# Patient Record
Sex: MALE | Race: WHITE | Employment: UNEMPLOYED | URBAN - METROPOLITAN AREA
[De-identification: names, ages, dates, MRNs, and addresses within clinical notes are randomized per-mention and may not be internally consistent; named-entity substitution may affect disease eponyms.]

---

## 2022-10-21 LAB — PROSTATE SPECIFIC ANTIGEN: 4.55 NG/ML (ref 0–4)

## 2023-02-11 ENCOUNTER — HOSPITAL ENCOUNTER (EMERGENCY)
Age: 53
Discharge: HOME | End: 2023-02-11
Attending: EMERGENCY MEDICINE
Payer: COMMERCIAL

## 2023-02-11 VITALS
SYSTOLIC BLOOD PRESSURE: 155 MMHG | RESPIRATION RATE: 18 BRPM | TEMPERATURE: 98.2 F | HEIGHT: 69 IN | WEIGHT: 184.97 LBS | BODY MASS INDEX: 27.4 KG/M2 | OXYGEN SATURATION: 96 % | HEART RATE: 99 BPM | DIASTOLIC BLOOD PRESSURE: 89 MMHG

## 2023-02-11 DIAGNOSIS — M62.838 SPASM OF MUSCLE: Primary | ICD-10-CM

## 2023-02-11 DIAGNOSIS — S12.9XXS CLOSED FRACTURE OF CERVICAL VERTEBRA, UNSPECIFIED CERVICAL VERTEBRAL LEVEL, SEQUELA: ICD-10-CM

## 2023-02-11 LAB
A/G RATIO: 1.8 (ref 1.1–2.2)
ALBUMIN SERPL-MCNC: 4.2 G/DL (ref 3.4–5)
ALP BLD-CCNC: 70 U/L (ref 40–129)
ALT SERPL-CCNC: 12 U/L (ref 10–40)
AMPHETAMINE SCREEN, URINE: ABNORMAL
ANION GAP SERPL CALCULATED.3IONS-SCNC: 18 MMOL/L (ref 3–16)
AST SERPL-CCNC: 15 U/L (ref 15–37)
BARBITURATE SCREEN URINE: ABNORMAL
BASOPHILS ABSOLUTE: 0.1 K/UL (ref 0–0.2)
BASOPHILS RELATIVE PERCENT: 1.8 %
BENZODIAZEPINE SCREEN, URINE: ABNORMAL
BILIRUB SERPL-MCNC: 0.5 MG/DL (ref 0–1)
BILIRUBIN URINE: NEGATIVE
BLOOD, URINE: ABNORMAL
BUN BLDV-MCNC: 9 MG/DL (ref 7–20)
CALCIUM SERPL-MCNC: 9.4 MG/DL (ref 8.3–10.6)
CANNABINOID SCREEN URINE: POSITIVE
CHLORIDE BLD-SCNC: 100 MMOL/L (ref 99–110)
CLARITY: CLEAR
CO2: 22 MMOL/L (ref 21–32)
COCAINE METABOLITE SCREEN URINE: ABNORMAL
COLOR: YELLOW
COMMENT UA: ABNORMAL
CREAT SERPL-MCNC: 1 MG/DL (ref 0.9–1.3)
CRYSTALS, UA: ABNORMAL /HPF
EOSINOPHILS ABSOLUTE: 0 K/UL (ref 0–0.6)
EOSINOPHILS RELATIVE PERCENT: 0.3 %
EPITHELIAL CELLS, UA: ABNORMAL /HPF (ref 0–5)
FENTANYL SCREEN, URINE: ABNORMAL
GFR SERPL CREATININE-BSD FRML MDRD: >60 ML/MIN/{1.73_M2}
GLUCOSE BLD-MCNC: 178 MG/DL (ref 70–99)
GLUCOSE URINE: NEGATIVE MG/DL
HCT VFR BLD CALC: 39.1 % (ref 40.5–52.5)
HEMOGLOBIN: 13.5 G/DL (ref 13.5–17.5)
HYALINE CASTS: ABNORMAL /LPF (ref 0–2)
KETONES, URINE: NEGATIVE MG/DL
LEUKOCYTE ESTERASE, URINE: NEGATIVE
LYMPHOCYTES ABSOLUTE: 1 K/UL (ref 1–5.1)
LYMPHOCYTES RELATIVE PERCENT: 13.5 %
Lab: ABNORMAL
MCH RBC QN AUTO: 29.9 PG (ref 26–34)
MCHC RBC AUTO-ENTMCNC: 34.5 G/DL (ref 31–36)
MCV RBC AUTO: 86.8 FL (ref 80–100)
METHADONE SCREEN, URINE: ABNORMAL
MICROSCOPIC EXAMINATION: YES
MONOCYTES ABSOLUTE: 0.3 K/UL (ref 0–1.3)
MONOCYTES RELATIVE PERCENT: 4.5 %
MUCUS: ABNORMAL /LPF
NEUTROPHILS ABSOLUTE: 5.7 K/UL (ref 1.7–7.7)
NEUTROPHILS RELATIVE PERCENT: 79.9 %
NITRITE, URINE: NEGATIVE
OPIATE SCREEN URINE: ABNORMAL
OXYCODONE URINE: POSITIVE
PDW BLD-RTO: 14 % (ref 12.4–15.4)
PH UA: 5.5
PH UA: 5.5 (ref 5–8)
PHENCYCLIDINE SCREEN URINE: ABNORMAL
PLATELET # BLD: 263 K/UL (ref 135–450)
PMV BLD AUTO: 8.2 FL (ref 5–10.5)
POTASSIUM REFLEX MAGNESIUM: 4.4 MMOL/L (ref 3.5–5.1)
PROTEIN UA: NEGATIVE MG/DL
RBC # BLD: 4.51 M/UL (ref 4.2–5.9)
RBC UA: ABNORMAL /HPF (ref 0–4)
SODIUM BLD-SCNC: 140 MMOL/L (ref 136–145)
SPECIFIC GRAVITY UA: 1.01 (ref 1–1.03)
TOTAL PROTEIN: 6.6 G/DL (ref 6.4–8.2)
URINE REFLEX TO CULTURE: ABNORMAL
URINE TYPE: ABNORMAL
UROBILINOGEN, URINE: 0.2 E.U./DL
WBC # BLD: 7.2 K/UL (ref 4–11)
WBC UA: ABNORMAL /HPF (ref 0–5)

## 2023-02-11 PROCEDURE — 2580000003 HC RX 258

## 2023-02-11 PROCEDURE — 96372 THER/PROPH/DIAG INJ SC/IM: CPT

## 2023-02-11 PROCEDURE — 80307 DRUG TEST PRSMV CHEM ANLYZR: CPT

## 2023-02-11 PROCEDURE — 96374 THER/PROPH/DIAG INJ IV PUSH: CPT

## 2023-02-11 PROCEDURE — 6360000002 HC RX W HCPCS

## 2023-02-11 PROCEDURE — 99284 EMERGENCY DEPT VISIT MOD MDM: CPT

## 2023-02-11 PROCEDURE — 6370000000 HC RX 637 (ALT 250 FOR IP)

## 2023-02-11 PROCEDURE — 85025 COMPLETE CBC W/AUTO DIFF WBC: CPT

## 2023-02-11 PROCEDURE — 81001 URINALYSIS AUTO W/SCOPE: CPT

## 2023-02-11 PROCEDURE — 80053 COMPREHEN METABOLIC PANEL: CPT

## 2023-02-11 PROCEDURE — 96375 TX/PRO/DX INJ NEW DRUG ADDON: CPT

## 2023-02-11 RX ORDER — MORPHINE SULFATE 2 MG/ML
2 INJECTION, SOLUTION INTRAMUSCULAR; INTRAVENOUS ONCE
Status: COMPLETED | OUTPATIENT
Start: 2023-02-11 | End: 2023-02-11

## 2023-02-11 RX ORDER — ORPHENADRINE CITRATE 30 MG/ML
60 INJECTION INTRAMUSCULAR; INTRAVENOUS ONCE
Status: COMPLETED | OUTPATIENT
Start: 2023-02-11 | End: 2023-02-11

## 2023-02-11 RX ORDER — DIAZEPAM 5 MG/1
5 TABLET ORAL ONCE
Status: COMPLETED | OUTPATIENT
Start: 2023-02-11 | End: 2023-02-11

## 2023-02-11 RX ORDER — SODIUM CHLORIDE, SODIUM LACTATE, POTASSIUM CHLORIDE, AND CALCIUM CHLORIDE .6; .31; .03; .02 G/100ML; G/100ML; G/100ML; G/100ML
1000 INJECTION, SOLUTION INTRAVENOUS ONCE
Status: COMPLETED | OUTPATIENT
Start: 2023-02-11 | End: 2023-02-11

## 2023-02-11 RX ORDER — DIAZEPAM 2 MG/1
2 TABLET ORAL EVERY 8 HOURS PRN
Qty: 10 TABLET | Refills: 0 | Status: SHIPPED | OUTPATIENT
Start: 2023-02-11 | End: 2023-02-21

## 2023-02-11 RX ORDER — ONDANSETRON 2 MG/ML
4 INJECTION INTRAMUSCULAR; INTRAVENOUS ONCE
Status: COMPLETED | OUTPATIENT
Start: 2023-02-11 | End: 2023-02-11

## 2023-02-11 RX ORDER — KETOROLAC TROMETHAMINE 30 MG/ML
30 INJECTION, SOLUTION INTRAMUSCULAR; INTRAVENOUS ONCE
Status: COMPLETED | OUTPATIENT
Start: 2023-02-11 | End: 2023-02-11

## 2023-02-11 RX ADMIN — ORPHENADRINE CITRATE 60 MG: 30 INJECTION INTRAMUSCULAR; INTRAVENOUS at 11:01

## 2023-02-11 RX ADMIN — KETOROLAC TROMETHAMINE 30 MG: 30 INJECTION, SOLUTION INTRAMUSCULAR; INTRAVENOUS at 10:59

## 2023-02-11 RX ADMIN — SODIUM CHLORIDE, POTASSIUM CHLORIDE, SODIUM LACTATE AND CALCIUM CHLORIDE 1000 ML: 600; 310; 30; 20 INJECTION, SOLUTION INTRAVENOUS at 11:04

## 2023-02-11 RX ADMIN — ONDANSETRON 4 MG: 2 INJECTION INTRAMUSCULAR; INTRAVENOUS at 12:24

## 2023-02-11 RX ADMIN — MORPHINE SULFATE 2 MG: 2 INJECTION, SOLUTION INTRAMUSCULAR; INTRAVENOUS at 12:25

## 2023-02-11 RX ADMIN — DIAZEPAM 5 MG: 5 TABLET ORAL at 12:04

## 2023-02-11 ASSESSMENT — ENCOUNTER SYMPTOMS
CONSTIPATION: 0
COUGH: 0
ABDOMINAL PAIN: 0
SHORTNESS OF BREATH: 0
DIARRHEA: 0
VOMITING: 0
EYE PAIN: 0
RHINORRHEA: 0
BACK PAIN: 0
NAUSEA: 0
SORE THROAT: 0

## 2023-02-11 ASSESSMENT — PAIN DESCRIPTION - DESCRIPTORS: DESCRIPTORS: CRAMPING;SPASM

## 2023-02-11 ASSESSMENT — PAIN DESCRIPTION - LOCATION
LOCATION: BACK

## 2023-02-11 ASSESSMENT — PAIN SCALES - GENERAL
PAINLEVEL_OUTOF10: 10
PAINLEVEL_OUTOF10: 10
PAINLEVEL_OUTOF10: 6
PAINLEVEL_OUTOF10: 7

## 2023-02-11 NOTE — DISCHARGE INSTRUCTIONS
Please make sure you follow-up with your family doctor and physical therapy and neurosurgery as we discussed. Return to ED for any worsening symptoms. Please be careful taking Valium. This medication can make you sleepy. You have been prescribed medication, valium, that causes drowsiness. While this is not a problem under normal circumstances, when taken with alcohol or while driving or operating heavy machinery, this medicine could cause you to become too sleepy and/or hurt yourself or others. Therefore, it is very important that you DO NOT DRIVE, DRINK ALCOHOL, OR OPERATE HEAVY MACHINERY WHILE TAKING THE MEDICINE(S) LISTED ABOVE.

## 2023-02-11 NOTE — ED NOTES
Pt able to ambulate in ED without incident. Justine Souza NP notified.      Reynaldo De Souza RN  02/11/23 8788

## 2023-02-11 NOTE — ED PROVIDER NOTES
Attending Supervisory Note/Shared Visit   I have personally performed a face to face diagnostic evaluation on this patient. I have reviewed the mid-levels findings and agree. History and Exam by me shows an alert white male in no acute distress. Patient had a cervical spine injury in October 2021. He has residual hyperesthesias above his waist in his torso and extremities. He has decreased sensation below his waist.  He has had some muscle spasms in his legs in the past.  He is on baclofen. He states they have been worse the last 2 days. General: Alert white male no acute distress. Heart: Regular rate and rhythm. No murmurs or gallops noted. Lungs: Breath sounds equal bilaterally and clear. Abdomen: Soft, nondistended, nontender. Musculoskeletal: No edema upper or lower extremities. Intact distal pulses. Skin: Warm and dry, good turgor. Labs Reviewed   COMPREHENSIVE METABOLIC PANEL W/ REFLEX TO MG FOR LOW K - Abnormal; Notable for the following components:       Result Value    Anion Gap 18 (*)     Glucose 178 (*)     All other components within normal limits   CBC WITH AUTO DIFFERENTIAL - Abnormal; Notable for the following components:    Hematocrit 39.1 (*)     All other components within normal limits   URINALYSIS WITH REFLEX TO CULTURE - Abnormal; Notable for the following components:    Blood, Urine MODERATE (*)     All other components within normal limits   URINE DRUG SCREEN - Abnormal; Notable for the following components:    Cannabinoid Scrn, Ur POSITIVE (*)     Oxycodone Urine POSITIVE (*)     All other components within normal limits   MICROSCOPIC URINALYSIS - Abnormal; Notable for the following components:    Hyaline Casts, UA 3-5 (*)     Mucus, UA 1+ (*)     Crystals, UA Few Ca. Oxalate (*)     All other components within normal limits     This patient's had a previous cervical spine injury as above. He has had problems with spasms in his legs. He takes baclofen.   They have been worse the last 2 days. The baclofen does not seem to be helping. His electrolytes are normal.  He was medicated initially with Toradol and Norflex with minimal improvement. He was subsequently given some Valium and morphine with significant relief. He is already on oxycodone at home. He will be discharged with a prescription for short-term Valium and close outpatient follow-up for management of his spasticity. 1. Spasm of muscle    2.  Closed fracture of cervical vertebra, unspecified cervical vertebral level, sequela      Disposition:  Discharge / Home      (Please note that portions of this note were completed with a voice recognition program.  Efforts were made to edit the dictations but occasionally words are mis-transcribed.)    Estella Hussein MD  Attending Emergency Physician        Beatrice Rubi MD  02/11/23 3143

## 2023-02-11 NOTE — ED PROVIDER NOTES
[address] [facility]        Pt Name: Anne Bailey  MRN: [id number]  [place] 1970  Date of evaluation: 2/11/2023  Provider: PAULINO Isidro - CNP  PCP: Fani Buenrostro MD  Note Started: 10:26 AM EST 2/11/23       I have seen and evaluated this patient with my supervising physician Benedict Saini MD.      [address] [place] [address]       Chief Complaint   Patient presents with    Spasms     Pt arrives via EMS from his PCP for muscle spasms in his legs and \"all the way up. \" Pt states he has had muscle spasms off and on since he broke his neck. Pt states his spasms have been worse the past 2 days       HISTORY OF PRESENT ILLNESS: 1 or more Elements     History From: pt      Anne Bailey is a 46 y.o. male who presents to Ed with c/o spasm from knees up to chest.   States has similar episodes all the time after spinal surgery 1.5 years ago. This episode started 2 days ago and is now worse. Nothing makes symptoms better. Hyperesthesia trunk, old  Home baclofen not helping. Already on oxy @ home. Has sufficient. Nursing Notes were all reviewed and agreed with or any disagreements were addressed in the HPI. REVIEW OF SYSTEMS :      Review of Systems   Constitutional:  Negative for chills, diaphoresis and fever. HENT:  Negative for congestion, rhinorrhea and sore throat. Eyes:  Negative for pain and visual disturbance. Respiratory:  Negative for cough and shortness of breath. Cardiovascular:  Negative for chest pain and leg swelling. Gastrointestinal:  Negative for abdominal pain, constipation, diarrhea, nausea and vomiting. Genitourinary:  Negative for frequency and hematuria. Musculoskeletal:  Positive for arthralgias and myalgias. Negative for back pain and neck pain. Skin:  Negative for rash and wound. Neurological:  Negative for dizziness and light-headedness.      Positives and Pertinent negatives as per HPI.     SURGICAL HISTORY     Past Surgical History:   Procedure Laterality Date    ANTERIOR CRUCIATE LIGAMENT REPAIR  2011    right    CLAVICLE SURGERY  2006    HIP SURGERY  1989    pins       CURRENTMEDICATIONS       Discharge Medication List as of 2023  1:16 PM        CONTINUE these medications which have NOT CHANGED    Details   LANTUS 100 UNIT/ML injection Historical Med      HUMALOG 100 UNIT/ML injection Historical Med      SIMVASTATIN PO Take  by mouth. ALLERGIES     Patient has no known allergies. FAMILYHISTORY       Family History   Problem Relation Age of Onset    Diabetes Paternal Grandmother         SOCIAL HISTORY       Social History     Tobacco Use    Smoking status: Former     Types: Cigarettes     Quit date: 2000     Years since quittin.1    Smokeless tobacco: Never   Substance Use Topics    Alcohol use: Yes    Drug use: No       SCREENINGS        [place] Coma Scale  Eye Opening: Spontaneous  Best Verbal Response: Oriented  Best Motor Response: Obeys commands  [place] Coma Scale Score: 15                CIWA Assessment  BP: (!) 155/89  Heart Rate: 99           PHYSICAL EXAM  1 or more Elements     ED Triage Vitals   BP Temp Temp src Pulse Resp SpO2 Height Weight   -- -- -- -- -- -- -- --       Physical Exam  Vitals and nursing note reviewed. Constitutional:       Appearance: Normal appearance. He is obese. He is not ill-appearing or diaphoretic. HENT:      Head: Normocephalic and atraumatic. Right Ear: External ear normal.      Left Ear: External ear normal.      Nose: Nose normal. No congestion or rhinorrhea. Mouth/Throat:      Mouth: Mucous membranes are moist.      Pharynx: Oropharynx is clear. No oropharyngeal exudate or posterior oropharyngeal erythema. Eyes:      General: No scleral icterus. Right eye: No discharge. Left eye: No discharge. Extraocular Movements: Extraocular movements intact.       Conjunctiva/sclera: Conjunctivae normal.      Pupils: Pupils are equal, round, and reactive to light. Cardiovascular:      Rate and Rhythm: Normal rate and regular rhythm. Pulses: Normal pulses. Heart sounds: Normal heart sounds. No murmur heard. No friction rub. No gallop. Pulmonary:      Effort: Pulmonary effort is normal. No respiratory distress. Breath sounds: Normal breath sounds. No stridor. No wheezing, rhonchi or rales. Abdominal:      General: Abdomen is flat. Bowel sounds are normal. There is no distension. Palpations: Abdomen is soft. Tenderness: There is no abdominal tenderness. There is no right CVA tenderness, left CVA tenderness or guarding. Musculoskeletal:         General: No deformity. Normal range of motion. Cervical back: Normal range of motion and neck supple. No rigidity. Right lower leg: No edema. Left lower leg: No edema. Lymphadenopathy:      Cervical: No cervical adenopathy. Skin:     General: Skin is warm and dry. Capillary Refill: Capillary refill takes less than 2 seconds. Coloration: Skin is not jaundiced or pale. Findings: No bruising or rash. Neurological:      General: No focal deficit present. Mental Status: He is alert and oriented to person, place, and time. Mental status is at baseline.    Psychiatric:         Mood and Affect: Mood normal.         Behavior: Behavior normal.         DIAGNOSTIC RESULTS   LABS:    Labs Reviewed   COMPREHENSIVE METABOLIC PANEL W/ REFLEX TO MG FOR LOW K - Abnormal; Notable for the following components:       Result Value    Anion Gap 18 (*)     Glucose 178 (*)     All other components within normal limits   CBC WITH AUTO DIFFERENTIAL - Abnormal; Notable for the following components:    Hematocrit 39.1 (*)     All other components within normal limits   URINALYSIS WITH REFLEX TO CULTURE - Abnormal; Notable for the following components:    Blood, Urine MODERATE (*)     All other components within normal limits   URINE DRUG SCREEN - Abnormal; Notable for the following components:    Cannabinoid Scrn, Ur POSITIVE (*)     Oxycodone Urine POSITIVE (*)     All other components within normal limits   MICROSCOPIC URINALYSIS - Abnormal; Notable for the following components:    Hyaline Casts, UA 3-5 (*)     Mucus, UA 1+ (*)     Crystals, UA Few Ca. Oxalate (*)     All other components within normal limits       When ordered only abnormal lab results are displayed. All other labs were within normal range or not returned as of this dictation. EKG: When ordered, EKG's are interpreted by the Emergency Department Physician in the absence of a cardiologist.  Please see their note for interpretation of EKG. RADIOLOGY:   Non-plain film images such as CT, Ultrasound and MRI are read by the radiologist. Plain radiographic images are visualized and preliminarily interpreted by the ED Provider with the below findings:        Interpretation per the Radiologist below, if available at the time of this note:    No orders to display     No results found. No results found. PROCEDURES   Unless otherwise noted below, none     Procedures    CRITICAL CARE TIME (.cctime)   I Marion CNP, am the primary clinician of record  I provided 5 minutes of non concurrent Critical Care time, excluding separately reportable procedures. There was a high probability of clinically significant/life threatening deterioration in the patient's condition which required my urgent intervention. This time spent assessing, reassessing patient, chart review and discussing case with other providers      PAST MEDICAL HISTORY      has a past medical history of Diabetes mellitus ([address] and Hyperlipidemia.      EMERGENCY DEPARTMENT COURSE and DIFFERENTIAL DIAGNOSIS/MDM:   Vitals:    Vitals:    02/11/23 1045 02/11/23 1206 02/11/23 1245 02/11/23 1315   BP: (!) 126/58  (!) 142/82 (!) 155/89   Pulse: 92 (!) 101 94 99   Resp:  18 18 18   Temp: TempSrc:       SpO2: 100% 98% 96% 96%   Weight:       Height:           Patient was given the following medications:  Medications   lactated ringers bolus (0 mLs IntraVENous Stopped 2/11/23 1314)   ketorolac (TORADOL) injection 30 mg (30 mg IntraMUSCular Given 2/11/23 1059)   orphenadrine (NORFLEX) injection 60 mg (60 mg IntraMUSCular Given 2/11/23 1101)   diazePAM (VALIUM) tablet 5 mg (5 mg Oral Given 2/11/23 1204)   morphine (PF) injection 2 mg (2 mg IntraVENous Given 2/11/23 1225)   ondansetron (ZOFRAN) injection 4 mg (4 mg IntraVENous Given 2/11/23 1224)             Is this patient to be included in the SEP-1 Core Measure due to severe sepsis or septic shock? No   Exclusion criteria - the patient is NOT to be included for SEP-1 Core Measure due to:  2+ SIRS criteria are not met    Chronic Conditions affecting care:    has a past medical history of Diabetes mellitus ([facility] [address] and Hyperlipidemia. CONSULTS: (Who and What was discussed)  None      Social Determinants Significantly Affecting Health : Patient has / had difficulty affording medications     Records Reviewed (External and Source) MRI 10/21. Visit notes from 2021 when pt had fall with c spine injury    CC/HPI Summary, DDx, ED Course, and Reassessment:     47 yo with muscle spasms as above    Has history of same, worsening last 2 days as above. NO new neurologic deficits. VSS    Started on toradol and muscle relaxer with brief relief. Given dose of PO valium with mild relief. Then escalated to IV morphine with good relief    Able to ambulate around department w/o distress after medication. Labs and UA ordered. No electrolyte derangement. No evidence of infection. Patient will be discharged home. He already has pain medication. We will start him on a brief course of Valium. Referral also provided for physical therapy and neurosurgery for further management. Findings discussed with patient and his wife.   Agreeable    The patient is at low risk for mortality based on demographic, history and clinical factors. Given the best available information and clinical assessment, I estimate the risk of hospitalization to be greater than risk of treatment at home. I have explained to the patient that the risk could rapidly change, given precautions for return and instructions. Explained to patient that the risk for mortality is low based on demographic, history and clinical factors. I discussed with patient the results of evaluation in the ED, diagnosis, care, and prognosis. The plan is to discharge to home. Patient is in agreement with plan and questions have been answered. I also discussed with patient the reasons which may require a return visit and the importance of follow-up care. The patient is well-appearing, nontoxic, and improved at the time of discharge. Patient agrees to call to arrange follow-up care as directed. Patient understands to return immediately for worsening/change in symptoms. Disposition Considerations (tests considered but not done, Admit vs D/C, Shared Decision Making, Pt Expectation of Test or Tx.):     I am the Primary Clinician of Record. FINAL IMPRESSION      1. Spasm of muscle    2. Closed fracture of cervical vertebra, unspecified cervical vertebral level, sequela          DISPOSITION/PLAN     DISPOSITION Decision To Discharge 02/11/2023 01:01:20 PM      PATIENT REFERRED TO:  [facility]  [address] [facility] [address]  [phone]  Call in 2 days  Follow up on your recent ED visit    Ester Bustillos MD  [address]   [address] [facility]  [phone]    Call in 2 days  Follow up on your recent ED visit    Cm Driscoll MD  . [place] [address] [address] [facility]  [phone]    Call in 2 days  Follow up on your recent ED visit    DISCHARGE MEDICATIONS:  Discharge Medication List as of 2/11/2023  1:16 PM        START taking these medications Details   diazePAM (VALIUM) 2 MG tablet Take 1 tablet by mouth every 8 hours as needed for Anxiety (Pain/Spasm) for up to 10 days.  Max Daily Amount: 6 mg, Disp-10 tablet, R-0Print             DISCONTINUED MEDICATIONS:  Discharge Medication List as of 2/11/2023  1:16 PM                 (Please note that portions of this note were completed with a voice recognition program.  Efforts were made to edit the dictations but occasionally words are mis-transcribed.)    PAULINO Joshua CNP (electronically signed)        PAULINO Joshua CNP  02/11/23 0447

## 2023-02-25 ENCOUNTER — APPOINTMENT (OUTPATIENT)
Dept: CT IMAGING | Age: 53
End: 2023-02-25
Payer: COMMERCIAL

## 2023-02-25 ENCOUNTER — HOSPITAL ENCOUNTER (INPATIENT)
Age: 53
LOS: 4 days | Discharge: HOME | End: 2023-03-02
Attending: EMERGENCY MEDICINE | Admitting: STUDENT IN AN ORGANIZED HEALTH CARE EDUCATION/TRAINING PROGRAM
Payer: COMMERCIAL

## 2023-02-25 ENCOUNTER — APPOINTMENT (OUTPATIENT)
Dept: GENERAL RADIOLOGY | Age: 53
End: 2023-02-25
Payer: COMMERCIAL

## 2023-02-25 DIAGNOSIS — M62.838 SPASM OF MUSCLE: ICD-10-CM

## 2023-02-25 DIAGNOSIS — S12.9XXD CLOSED FRACTURE OF CERVICAL VERTEBRA, SUBSEQUENT ENCOUNTER: ICD-10-CM

## 2023-02-25 DIAGNOSIS — M54.9 INTRACTABLE BACK PAIN: Primary | ICD-10-CM

## 2023-02-25 LAB
A/G RATIO: 1.8 (ref 1.1–2.2)
ALBUMIN SERPL-MCNC: 4.5 G/DL (ref 3.4–5)
ALP BLD-CCNC: 69 U/L (ref 40–129)
ALT SERPL-CCNC: 10 U/L (ref 10–40)
ANION GAP SERPL CALCULATED.3IONS-SCNC: 12 MMOL/L (ref 3–16)
AST SERPL-CCNC: 21 U/L (ref 15–37)
BASE EXCESS VENOUS: 1 MMOL/L (ref -3–3)
BASOPHILS ABSOLUTE: 0 K/UL (ref 0–0.2)
BASOPHILS RELATIVE PERCENT: 0.4 %
BILIRUB SERPL-MCNC: 0.7 MG/DL (ref 0–1)
BILIRUBIN URINE: NEGATIVE
BLOOD, URINE: NEGATIVE
BUN BLDV-MCNC: 9 MG/DL (ref 7–20)
CALCIUM SERPL-MCNC: 10 MG/DL (ref 8.3–10.6)
CHLORIDE BLD-SCNC: 106 MMOL/L (ref 99–110)
CLARITY: CLEAR
CO2: 26 MMOL/L (ref 21–32)
COLOR: YELLOW
CREAT SERPL-MCNC: 1 MG/DL (ref 0.9–1.3)
EOSINOPHILS ABSOLUTE: 0 K/UL (ref 0–0.6)
EOSINOPHILS RELATIVE PERCENT: 0.2 %
GFR SERPL CREATININE-BSD FRML MDRD: >60 ML/MIN/{1.73_M2}
GLUCOSE BLD-MCNC: 159 MG/DL (ref 70–99)
GLUCOSE BLD-MCNC: 58 MG/DL (ref 70–99)
GLUCOSE BLD-MCNC: 73 MG/DL (ref 70–99)
GLUCOSE URINE: 250 MG/DL
HCO3 VENOUS: 26.3 MMOL/L (ref 23–29)
HCT VFR BLD CALC: 39.7 % (ref 40.5–52.5)
HEMOGLOBIN: 13.5 G/DL (ref 13.5–17.5)
KETONES, URINE: NEGATIVE MG/DL
LEUKOCYTE ESTERASE, URINE: NEGATIVE
LYMPHOCYTES ABSOLUTE: 1.4 K/UL (ref 1–5.1)
LYMPHOCYTES RELATIVE PERCENT: 14.3 %
MCH RBC QN AUTO: 30.1 PG (ref 26–34)
MCHC RBC AUTO-ENTMCNC: 34 G/DL (ref 31–36)
MCV RBC AUTO: 88.7 FL (ref 80–100)
MICROSCOPIC EXAMINATION: ABNORMAL
MONOCYTES ABSOLUTE: 0.5 K/UL (ref 0–1.3)
MONOCYTES RELATIVE PERCENT: 5 %
NEUTROPHILS ABSOLUTE: 7.8 K/UL (ref 1.7–7.7)
NEUTROPHILS RELATIVE PERCENT: 80.1 %
NITRITE, URINE: NEGATIVE
O2 SAT, VEN: 90 %
O2 THERAPY: ABNORMAL
PCO2, VEN: 45.3 MMHG (ref 40–50)
PDW BLD-RTO: 14.2 % (ref 12.4–15.4)
PERFORMED ON: ABNORMAL
PERFORMED ON: ABNORMAL
PH UA: 6 (ref 5–8)
PH VENOUS: 7.37 (ref 7.35–7.45)
PLATELET # BLD: 346 K/UL (ref 135–450)
PMV BLD AUTO: 8.4 FL (ref 5–10.5)
PO2, VEN: 61 MMHG (ref 25–40)
POTASSIUM REFLEX MAGNESIUM: 4.3 MMOL/L (ref 3.5–5.1)
PROTEIN UA: NEGATIVE MG/DL
RBC # BLD: 4.47 M/UL (ref 4.2–5.9)
SODIUM BLD-SCNC: 144 MMOL/L (ref 136–145)
SPECIFIC GRAVITY UA: 1.02 (ref 1–1.03)
TCO2 CALC VENOUS: 28 MMOL/L
TOTAL PROTEIN: 7 G/DL (ref 6.4–8.2)
URINE REFLEX TO CULTURE: ABNORMAL
URINE TYPE: ABNORMAL
UROBILINOGEN, URINE: 0.2 E.U./DL
WBC # BLD: 9.7 K/UL (ref 4–11)

## 2023-02-25 PROCEDURE — 6360000002 HC RX W HCPCS: Performed by: PHYSICIAN ASSISTANT

## 2023-02-25 PROCEDURE — 36415 COLL VENOUS BLD VENIPUNCTURE: CPT

## 2023-02-25 PROCEDURE — 71045 X-RAY EXAM CHEST 1 VIEW: CPT

## 2023-02-25 PROCEDURE — 2580000003 HC RX 258: Performed by: EMERGENCY MEDICINE

## 2023-02-25 PROCEDURE — 96374 THER/PROPH/DIAG INJ IV PUSH: CPT

## 2023-02-25 PROCEDURE — 6370000000 HC RX 637 (ALT 250 FOR IP): Performed by: PHYSICIAN ASSISTANT

## 2023-02-25 PROCEDURE — 99285 EMERGENCY DEPT VISIT HI MDM: CPT

## 2023-02-25 PROCEDURE — 82803 BLOOD GASES ANY COMBINATION: CPT

## 2023-02-25 PROCEDURE — 72131 CT LUMBAR SPINE W/O DYE: CPT

## 2023-02-25 PROCEDURE — 6360000002 HC RX W HCPCS: Performed by: EMERGENCY MEDICINE

## 2023-02-25 PROCEDURE — 2500000003 HC RX 250 WO HCPCS: Performed by: EMERGENCY MEDICINE

## 2023-02-25 PROCEDURE — 96375 TX/PRO/DX INJ NEW DRUG ADDON: CPT

## 2023-02-25 PROCEDURE — 81003 URINALYSIS AUTO W/O SCOPE: CPT

## 2023-02-25 PROCEDURE — 96372 THER/PROPH/DIAG INJ SC/IM: CPT

## 2023-02-25 PROCEDURE — 80053 COMPREHEN METABOLIC PANEL: CPT

## 2023-02-25 PROCEDURE — 85025 COMPLETE CBC W/AUTO DIFF WBC: CPT

## 2023-02-25 RX ORDER — MORPHINE SULFATE 2 MG/ML
2 INJECTION, SOLUTION INTRAMUSCULAR; INTRAVENOUS ONCE
Status: COMPLETED | OUTPATIENT
Start: 2023-02-25 | End: 2023-02-25

## 2023-02-25 RX ORDER — OXYCODONE HYDROCHLORIDE AND ACETAMINOPHEN 5; 325 MG/1; MG/1
1 TABLET ORAL ONCE
Status: COMPLETED | OUTPATIENT
Start: 2023-02-25 | End: 2023-02-25

## 2023-02-25 RX ORDER — BACLOFEN 10 MG/1
10 TABLET ORAL 4 TIMES DAILY PRN
Status: ON HOLD | COMMUNITY
Start: 2022-12-13 | End: 2023-03-02 | Stop reason: HOSPADM

## 2023-02-25 RX ORDER — MORPHINE SULFATE 2 MG/ML
2 INJECTION, SOLUTION INTRAMUSCULAR; INTRAVENOUS
Status: COMPLETED | OUTPATIENT
Start: 2023-02-25 | End: 2023-02-25

## 2023-02-25 RX ORDER — FLUMAZENIL 0.1 MG/ML
0.2 INJECTION INTRAVENOUS ONCE
Status: COMPLETED | OUTPATIENT
Start: 2023-02-25 | End: 2023-02-25

## 2023-02-25 RX ORDER — TAMSULOSIN HYDROCHLORIDE 0.4 MG/1
0.4 CAPSULE ORAL DAILY
COMMUNITY
Start: 2023-01-10

## 2023-02-25 RX ORDER — KETOROLAC TROMETHAMINE 30 MG/ML
30 INJECTION, SOLUTION INTRAMUSCULAR; INTRAVENOUS ONCE
Status: COMPLETED | OUTPATIENT
Start: 2023-02-25 | End: 2023-02-25

## 2023-02-25 RX ORDER — OXYCODONE AND ACETAMINOPHEN 10; 325 MG/1; MG/1
1 TABLET ORAL ONCE
Status: DISCONTINUED | OUTPATIENT
Start: 2023-02-25 | End: 2023-02-25

## 2023-02-25 RX ORDER — INSULIN DEGLUDEC INJECTION 100 U/ML
INJECTION, SOLUTION SUBCUTANEOUS
COMMUNITY
Start: 2022-11-28 | End: 2023-02-25

## 2023-02-25 RX ORDER — OXYCODONE HYDROCHLORIDE 5 MG/1
15 TABLET ORAL NIGHTLY
Status: ON HOLD | COMMUNITY
Start: 2023-02-24 | End: 2023-03-02 | Stop reason: HOSPADM

## 2023-02-25 RX ORDER — BLOOD-GLUCOSE SENSOR
EACH MISCELLANEOUS
COMMUNITY
Start: 2023-02-06

## 2023-02-25 RX ORDER — BLOOD-GLUCOSE TRANSMITTER
EACH MISCELLANEOUS
COMMUNITY
Start: 2023-02-04

## 2023-02-25 RX ORDER — DEXTROSE MONOHYDRATE 25 G/50ML
25 INJECTION, SOLUTION INTRAVENOUS ONCE
Status: COMPLETED | OUTPATIENT
Start: 2023-02-25 | End: 2023-02-25

## 2023-02-25 RX ORDER — NALOXONE HYDROCHLORIDE 0.4 MG/ML
INJECTION, SOLUTION INTRAMUSCULAR; INTRAVENOUS; SUBCUTANEOUS
Status: COMPLETED
Start: 2023-02-25 | End: 2023-02-26

## 2023-02-25 RX ORDER — NALOXONE HYDROCHLORIDE 1 MG/ML
INJECTION INTRAMUSCULAR; INTRAVENOUS; SUBCUTANEOUS
Status: COMPLETED
Start: 2023-02-25 | End: 2023-02-26

## 2023-02-25 RX ORDER — NALOXONE HYDROCHLORIDE 0.4 MG/ML
0.4 INJECTION, SOLUTION INTRAMUSCULAR; INTRAVENOUS; SUBCUTANEOUS ONCE
Status: COMPLETED | OUTPATIENT
Start: 2023-02-25 | End: 2023-02-25

## 2023-02-25 RX ORDER — LORAZEPAM 2 MG/ML
1 INJECTION INTRAMUSCULAR ONCE
Status: COMPLETED | OUTPATIENT
Start: 2023-02-25 | End: 2023-02-25

## 2023-02-25 RX ORDER — OXYCODONE HYDROCHLORIDE 5 MG/1
5 TABLET ORAL ONCE
Status: COMPLETED | OUTPATIENT
Start: 2023-02-25 | End: 2023-02-25

## 2023-02-25 RX ORDER — SODIUM CHLORIDE 9 MG/ML
INJECTION, SOLUTION INTRAVENOUS ONCE
Status: COMPLETED | OUTPATIENT
Start: 2023-02-25 | End: 2023-02-25

## 2023-02-25 RX ORDER — DIAZEPAM 5 MG/1
5 TABLET ORAL ONCE
Status: COMPLETED | OUTPATIENT
Start: 2023-02-25 | End: 2023-02-25

## 2023-02-25 RX ORDER — ATORVASTATIN CALCIUM 20 MG/1
TABLET, FILM COATED ORAL
COMMUNITY
Start: 2021-05-24 | End: 2023-02-25

## 2023-02-25 RX ADMIN — MORPHINE SULFATE 2 MG: 2 INJECTION, SOLUTION INTRAMUSCULAR; INTRAVENOUS at 19:10

## 2023-02-25 RX ADMIN — NALOXONE HYDROCHLORIDE 0.4 MG: 0.4 INJECTION, SOLUTION INTRAMUSCULAR; INTRAVENOUS; SUBCUTANEOUS at 23:11

## 2023-02-25 RX ADMIN — MORPHINE SULFATE 2 MG: 2 INJECTION, SOLUTION INTRAMUSCULAR; INTRAVENOUS at 18:42

## 2023-02-25 RX ADMIN — HYDROMORPHONE HYDROCHLORIDE 2 MG: 1 INJECTION, SOLUTION INTRAMUSCULAR; INTRAVENOUS; SUBCUTANEOUS at 21:21

## 2023-02-25 RX ADMIN — LORAZEPAM 1 MG: 2 INJECTION INTRAMUSCULAR; INTRAVENOUS at 21:23

## 2023-02-25 RX ADMIN — SODIUM CHLORIDE: 9 INJECTION, SOLUTION INTRAVENOUS at 23:11

## 2023-02-25 RX ADMIN — OXYCODONE 5 MG: 5 TABLET ORAL at 19:54

## 2023-02-25 RX ADMIN — OXYCODONE AND ACETAMINOPHEN 1 TABLET: 5; 325 TABLET ORAL at 19:54

## 2023-02-25 RX ADMIN — KETOROLAC TROMETHAMINE 30 MG: 30 INJECTION, SOLUTION INTRAMUSCULAR; INTRAVENOUS at 18:45

## 2023-02-25 RX ADMIN — DIAZEPAM 5 MG: 5 TABLET ORAL at 18:42

## 2023-02-25 RX ADMIN — FLUMAZENIL 0.2 MG: 0.1 INJECTION, SOLUTION INTRAVENOUS at 23:52

## 2023-02-25 RX ADMIN — DEXTROSE MONOHYDRATE 25 G: 25 INJECTION, SOLUTION INTRAVENOUS at 23:11

## 2023-02-25 ASSESSMENT — ENCOUNTER SYMPTOMS
COLOR CHANGE: 0
BACK PAIN: 1
NAUSEA: 0
VOMITING: 0
SHORTNESS OF BREATH: 0
COUGH: 0

## 2023-02-25 ASSESSMENT — PAIN SCALES - GENERAL
PAINLEVEL_OUTOF10: 10
PAINLEVEL_OUTOF10: 10
PAINLEVEL_OUTOF10: 0
PAINLEVEL_OUTOF10: 10

## 2023-02-25 NOTE — ED NOTES
Pt states had similar spasm about 2 weeks ago  Hx of cervical injury in past     Norm Lakshmi, [facility]  02/25/23 0608

## 2023-02-25 NOTE — ED PROVIDER NOTES
[address] ENCOUNTER        Pt Name: Ines Castellanos  MRN: [id number]  [place] 1970  Date of evaluation: 2/25/2023  Provider: REHANA Kinney  PCP: Bhavesh Pereira  Note Started: 6:42 PM EST 2/25/23       I have seen and evaluated this patient with my supervising physician Hunter Rivera, [address]       Chief Complaint   Patient presents with    Other     Muscle spasm starting at 1pm today all over       HISTORY OF PRESENT ILLNESS: 1 or more Elements     History From: patient  Limitations to history : None    Ines Castellanos is a 46 y.o. male who presents to the emergency department today with his wife with complaints of a muscle spasm. Patient reports that his symptoms started today around 1 PM.  He states muscle spasms are all over, and debilitating. He states that he does have a history of a cervical spine injury and historically has been treating his muscle spasms with baclofen, oxycodone. He has been out of his oxycodone for the last 3 to 4 days. His baclofen is not helping to improve his symptoms. He denies any recent reinjury or fall. He does follow regularly with his pain management providers, and is working to get a order for another imaging study to further evaluate his spine on an outpatient basis. He presents today because of the severity of pain, and a few weeks ago this happened and he was treated with morphine and Valium which did help to significantly improve his symptoms. He has no further complaints    Nursing Notes were all reviewed and agreed with or any disagreements were addressed in the HPI. REVIEW OF SYSTEMS :      Review of Systems   Constitutional:  Negative for chills and fever. Respiratory:  Negative for cough and shortness of breath. Cardiovascular:  Negative for chest pain and palpitations. Gastrointestinal:  Negative for nausea and vomiting.    Musculoskeletal:  Positive for back pain and myalgias. Negative for arthralgias. Spasms, upper back   Skin:  Negative for color change and rash. Neurological:  Negative for dizziness and headaches. Psychiatric/Behavioral:  Negative for agitation and confusion. Positives and Pertinent negatives as per HPI. SURGICAL HISTORY     Past Surgical History:   Procedure Laterality Date    ANTERIOR CRUCIATE LIGAMENT REPAIR  2011    right    CLAVICLE SURGERY  2006    HIP SURGERY  1989    pins       CURRENTMEDICATIONS       Previous Medications    BACLOFEN (LIORESAL) 10 MG TABLET    Take 10 mg by mouth 4 times daily as needed    CONTINUOUS BLOOD GLUC SENSOR (DEXCOM G6 SENSOR) MISC    USE 1 EVERY 10 DAYS    CONTINUOUS BLOOD GLUC TRANSMIT (DEXCOM G6 TRANSMITTER) MISC    USE 1 EVERY 90 DAYS    HUMALOG 100 UNIT/ML INJECTION        INSULIN ASPART (NOVOLOG) 100 UNIT/ML INJECTION PEN    Inject 5-10 Units into the skin 4 times daily (after meals and at bedtime)    LANTUS 100 UNIT/ML INJECTION        OXYCODONE (ROXICODONE) 5 MG IMMEDIATE RELEASE TABLET    Take 5 mg by mouth 3 times daily as needed. SIMVASTATIN PO    Take  by mouth. TAMSULOSIN (FLOMAX) 0.4 MG CAPSULE    Take 0.4 mg by mouth daily       ALLERGIES     Patient has no known allergies. FAMILYHISTORY       Family History   Problem Relation Age of Onset    Diabetes Paternal Grandmother         SOCIAL HISTORY       Social History     Tobacco Use    Smoking status: Former     Types: Cigarettes     Quit date: 2000     Years since quittin.1    Smokeless tobacco: Never   Substance Use Topics    Alcohol use: Yes    Drug use: No       SCREENINGS                         CIWA Assessment  BP: (!) 175/90 (Pt wouldn't stay still.  RN Manual Zearing notified at [facility])  Heart Rate: (!) 102           PHYSICAL EXAM  1 or more Elements     ED Triage Vitals [23 1818]   BP Temp Temp Source Heart Rate Resp SpO2 Height Weight   (!) 175/90 97.8 °F (36.6 °C) Oral (!) 102 27 97 % 6' (1.829 m) 140 lb (63.5 kg)       Physical Exam  Vitals and nursing note reviewed.   Constitutional:       General: He is not in acute distress.     Appearance: He is well-developed. He is diaphoretic. He is not ill-appearing or toxic-appearing.   HENT:      Head: Normocephalic and atraumatic.   Eyes:      Conjunctiva/sclera: Conjunctivae normal.      Pupils: Pupils are equal, round, and reactive to light.   Neck:      Comments: Posterior surgical scar noted  Cardiovascular:      Rate and Rhythm: Normal rate and regular rhythm.   Pulmonary:      Effort: Pulmonary effort is normal. No respiratory distress.   Abdominal:      General: Bowel sounds are normal.      Palpations: Abdomen is soft.   Skin:     General: Skin is warm.   Neurological:      General: No focal deficit present.      Mental Status: He is alert and oriented to person, place, and time.      Comments: Pacing room, sweating, but ambulatory and moving extremities   Psychiatric:         Mood and Affect: Mood normal.         Behavior: Behavior normal. Behavior is cooperative.           DIAGNOSTIC RESULTS   LABS:    Labs Reviewed - No data to display    When ordered only abnormal lab results are displayed. All other labs were within normal range or not returned as of this dictation.    EKG: When ordered, EKG's are interpreted by the Emergency Department Physician in the absence of a cardiologist.  Please see their note for interpretation of EKG.    RADIOLOGY:   Non-plain film images such as CT, Ultrasound and MRI are read by the radiologist. Plain radiographic images are visualized and preliminarily interpreted by the ED Provider with the below findings:    Interpretation per the Radiologist below, if available at the time of this note:    No orders to display     No results found.    No results found.    PROCEDURES   Unless otherwise noted below, none     Procedures    PAST MEDICAL HISTORY      has a past medical history of Diabetes mellitus (HCC) and Hyperlipidemia.  EMERGENCY DEPARTMENT COURSE and DIFFERENTIAL DIAGNOSIS/MDM:   Vitals:    Vitals:    02/25/23 1818   BP: (!) 175/90   Pulse: (!) 102   Resp: 27   Temp: 97.8 °F (36.6 °C)   TempSrc: Oral   SpO2: 97%   Weight: 140 lb (63.5 kg)   Height: 6' (1.829 m)       Patient was given the following medications:  Medications   morphine injection 2 mg (2 mg IntraMUSCular Given 2/25/23 1842)   diazePAM (VALIUM) tablet 5 mg (5 mg Oral Given 2/25/23 1842)   ketorolac (TORADOL) injection 30 mg (30 mg IntraMUSCular Given 2/25/23 1845)   morphine injection 2 mg (2 mg IntraMUSCular Given 2/25/23 1910)   oxyCODONE-acetaminophen (PERCOCET) 5-325 MG per tablet 1 tablet (1 tablet Oral Given 2/25/23 1954)     And   oxyCODONE (ROXICODONE) immediate release tablet 5 mg (5 mg Oral Given 2/25/23 1954)             Is this patient to be included in the SEP-1 Core Measure due to severe sepsis or septic shock? No   Exclusion criteria - the patient is NOT to be included for SEP-1 Core Measure due to: Infection is not suspected    Chronic Conditions affecting care:    has a past medical history of Diabetes mellitus ([facility] [address] and Hyperlipidemia. CONSULTS: (Who and What was discussed)  None      Social Determinants Significantly Affecting Health : None    Records Reviewed (External and Source) EMR, outpatient visits with pain management. PDMP reviewed, patient most recently had an oxycodone 5 mg tablet for quantity of 90, 30-day supply prescribed to him on 1/23/2023. CC/HPI Summary, DDx, ED Course, and Reassessment: This is a 49-year-old male who presents today with his wife with complaints of a muscle spasm. Patient reports that his symptoms started today around 1 PM.  He states muscle spasms are all over, and debilitating. He states that he does have a history of a cervical spine injury and historically has been treating his muscle spasms with baclofen, oxycodone. He has been out of his oxycodone for the last 3 to 4 days.   His baclofen is not helping to improve his symptoms. He denies any recent reinjury or fall. He does follow regularly with his pain management providers, and is working to get a order for another imaging study to further evaluate his spine on an outpatient basis. He presents today because of the severity of pain, and a few weeks ago this happened and he was treated with morphine and Valium which did help to significantly improve his symptoms. - Vital signs were reviewed. Exam as above. - Pertinent Labs & Imaging studies reviewed. (See chart for details)   -  Patient seen and evaluated in the emergency department. -  Triage and nursing notes reviewed and incorporated. -  Old chart records reviewed and incorporated. -   I have seen and evaluated this patient with my supervising physician Dimple Lennon DO.  -  Differential diagnosis includes abrasion/laceration, contusion, fracture, sprain/strain, dislocation, withdrawal, spasm, other  -  Work-up included:  See above  -  ED treatment included: Valium 5 mg p.o., morphine 2 mg IM, Toradol 15 mg IM. After being evaluated by Dr Cedric Barker, he advised another dose of morphine 2mg IM and percocet 10/325 mg PO as well in addition to what was given previously. - At 1952 the nursing staff notified myself and Dr. Cedric Barker that he was pacing the room, when he went to sit in a chair he missed the edge of it and fell landing on his bottom. He did not hit his head or lose consciousness. He was able to get himself up off of the ground. He states he does not have any increased pain in his back, buttocks, or hips. I did reevaluate him, he has no tenderness along his lower thoracic, lumbar spine. His pelvis is stable and he is able to lift both legs up and off the bed without difficulty. He has no neurological deficits on exam in his lower extremities after the fall.   At this time we do not think that imaging is indicated given the very short distance from the seat of the chair to the ground, and the normal exam findings. He was advised that he does need to stay in bed as we work to get his pain under control. Shortly after that, the nursing staff did provide to him his dose of oxycodone 5/325 and oxycodone 5 mg tablets, because we do not have the Percocet 10 mg/325 at this freestanding emergency department. - Critical Care:  0 minutes    *At the time of the shift change, re-evaluation was pending. Care was turned over to Dr Kaylene Napier. Please see his note for final diagnosis and disposition for this patient. FINAL IMPRESSION      1. Spasm of muscle    2. Closed fracture of cervical vertebra, subsequent encounter          DISPOSITION/PLAN     DISPOSITION        PATIENT REFERRED TO:  [address]  [address] [facility] [address]  [phone]    Schedule an appointment as soon as possible for a visit       [place] [id number] [phone]    If symptoms worsen    DISCHARGE MEDICATIONS:  New Prescriptions    No medications on file       DISCONTINUED MEDICATIONS:  Discontinued Medications    ATORVASTATIN (LIPITOR) 20 MG TABLET    TAKE 1 TABLET BY MOUTH EVERY DAY AT NIGHT    INSULIN DEGLUDEC (TRESIBA FLEXTOUCH) 100 UNIT/ML SOPN    INJECT 20-25 UNITS BY SUBCUTANEOUS ROUTE DAILY.               (Please note that portions of this note were completed with a voice recognition program.  Efforts were made to edit the dictations but occasionally words are mis-transcribed.)    REHANA Minaya (electronically signed)       Sheryl López [place]  02/25/23 [address] [place]  02/25/23 1956

## 2023-02-26 ENCOUNTER — APPOINTMENT (OUTPATIENT)
Dept: MRI IMAGING | Age: 53
End: 2023-02-26
Payer: COMMERCIAL

## 2023-02-26 ENCOUNTER — APPOINTMENT (OUTPATIENT)
Dept: CT IMAGING | Age: 53
End: 2023-02-26
Payer: COMMERCIAL

## 2023-02-26 PROBLEM — M54.9 INTRACTABLE BACK PAIN: Status: ACTIVE | Noted: 2023-02-26

## 2023-02-26 PROBLEM — E11.9 DM2 (DIABETES MELLITUS, TYPE 2) (HCC): Status: ACTIVE | Noted: 2023-02-26

## 2023-02-26 PROBLEM — E78.5 HLD (HYPERLIPIDEMIA): Status: ACTIVE | Noted: 2023-02-26

## 2023-02-26 LAB
GLUCOSE BLD-MCNC: 104 MG/DL (ref 70–99)
GLUCOSE BLD-MCNC: 107 MG/DL (ref 70–99)
GLUCOSE BLD-MCNC: 127 MG/DL (ref 70–99)
GLUCOSE BLD-MCNC: 127 MG/DL (ref 70–99)
GLUCOSE BLD-MCNC: 134 MG/DL (ref 70–99)
GLUCOSE BLD-MCNC: 50 MG/DL (ref 70–99)
GLUCOSE BLD-MCNC: 54 MG/DL (ref 70–99)
GLUCOSE BLD-MCNC: 57 MG/DL (ref 70–99)
GLUCOSE BLD-MCNC: 66 MG/DL (ref 70–99)
GLUCOSE BLD-MCNC: 73 MG/DL (ref 70–99)
GLUCOSE BLD-MCNC: 87 MG/DL (ref 70–99)
PERFORMED ON: ABNORMAL
PERFORMED ON: NORMAL
PERFORMED ON: NORMAL
TOTAL CK: 1221 U/L (ref 39–308)

## 2023-02-26 PROCEDURE — 2580000003 HC RX 258: Performed by: INTERNAL MEDICINE

## 2023-02-26 PROCEDURE — 6370000000 HC RX 637 (ALT 250 FOR IP): Performed by: INTERNAL MEDICINE

## 2023-02-26 PROCEDURE — 72125 CT NECK SPINE W/O DYE: CPT

## 2023-02-26 PROCEDURE — 6360000002 HC RX W HCPCS: Performed by: INTERNAL MEDICINE

## 2023-02-26 PROCEDURE — 2060000000 HC ICU INTERMEDIATE R&B

## 2023-02-26 PROCEDURE — 72146 MRI CHEST SPINE W/O DYE: CPT

## 2023-02-26 PROCEDURE — 72148 MRI LUMBAR SPINE W/O DYE: CPT

## 2023-02-26 PROCEDURE — 96375 TX/PRO/DX INJ NEW DRUG ADDON: CPT

## 2023-02-26 PROCEDURE — 36415 COLL VENOUS BLD VENIPUNCTURE: CPT

## 2023-02-26 PROCEDURE — 6370000000 HC RX 637 (ALT 250 FOR IP): Performed by: STUDENT IN AN ORGANIZED HEALTH CARE EDUCATION/TRAINING PROGRAM

## 2023-02-26 PROCEDURE — 82550 ASSAY OF CK (CPK): CPT

## 2023-02-26 PROCEDURE — 94760 N-INVAS EAR/PLS OXIMETRY 1: CPT

## 2023-02-26 PROCEDURE — 72141 MRI NECK SPINE W/O DYE: CPT

## 2023-02-26 PROCEDURE — 6360000002 HC RX W HCPCS: Performed by: STUDENT IN AN ORGANIZED HEALTH CARE EDUCATION/TRAINING PROGRAM

## 2023-02-26 PROCEDURE — 2580000003 HC RX 258: Performed by: STUDENT IN AN ORGANIZED HEALTH CARE EDUCATION/TRAINING PROGRAM

## 2023-02-26 PROCEDURE — 6360000002 HC RX W HCPCS: Performed by: EMERGENCY MEDICINE

## 2023-02-26 PROCEDURE — 96376 TX/PRO/DX INJ SAME DRUG ADON: CPT

## 2023-02-26 PROCEDURE — 6360000002 HC RX W HCPCS

## 2023-02-26 RX ORDER — DIAZEPAM 5 MG/1
10 TABLET ORAL EVERY 6 HOURS PRN
Status: DISCONTINUED | OUTPATIENT
Start: 2023-02-26 | End: 2023-03-02 | Stop reason: HOSPADM

## 2023-02-26 RX ORDER — ACETAMINOPHEN 500 MG
1000 TABLET ORAL EVERY 8 HOURS SCHEDULED
Status: DISCONTINUED | OUTPATIENT
Start: 2023-02-26 | End: 2023-03-02 | Stop reason: HOSPADM

## 2023-02-26 RX ORDER — NALOXONE HYDROCHLORIDE 0.4 MG/ML
0.4 INJECTION, SOLUTION INTRAMUSCULAR; INTRAVENOUS; SUBCUTANEOUS PRN
Status: DISCONTINUED | OUTPATIENT
Start: 2023-02-26 | End: 2023-02-26

## 2023-02-26 RX ORDER — NALOXONE HYDROCHLORIDE 0.4 MG/ML
0.4 INJECTION, SOLUTION INTRAMUSCULAR; INTRAVENOUS; SUBCUTANEOUS ONCE
Status: COMPLETED | OUTPATIENT
Start: 2023-02-26 | End: 2023-02-26

## 2023-02-26 RX ORDER — INSULIN GLARGINE 100 [IU]/ML
7 INJECTION, SOLUTION SUBCUTANEOUS DAILY
Status: DISCONTINUED | OUTPATIENT
Start: 2023-02-27 | End: 2023-03-01

## 2023-02-26 RX ORDER — ONDANSETRON 2 MG/ML
4 INJECTION INTRAMUSCULAR; INTRAVENOUS EVERY 6 HOURS PRN
Status: DISCONTINUED | OUTPATIENT
Start: 2023-02-26 | End: 2023-03-01

## 2023-02-26 RX ORDER — TAMSULOSIN HYDROCHLORIDE 0.4 MG/1
0.4 CAPSULE ORAL DAILY
Status: DISCONTINUED | OUTPATIENT
Start: 2023-02-26 | End: 2023-03-02 | Stop reason: HOSPADM

## 2023-02-26 RX ORDER — METHOCARBAMOL 500 MG/1
500 TABLET, FILM COATED ORAL 4 TIMES DAILY
Status: DISCONTINUED | OUTPATIENT
Start: 2023-02-26 | End: 2023-02-26

## 2023-02-26 RX ORDER — LIDOCAINE 4 G/G
1 PATCH TOPICAL DAILY
Status: DISCONTINUED | OUTPATIENT
Start: 2023-02-26 | End: 2023-03-02 | Stop reason: HOSPADM

## 2023-02-26 RX ORDER — INSULIN GLARGINE 100 [IU]/ML
13 INJECTION, SOLUTION SUBCUTANEOUS DAILY
Status: DISCONTINUED | OUTPATIENT
Start: 2023-02-26 | End: 2023-02-26

## 2023-02-26 RX ORDER — ACETAMINOPHEN 325 MG/1
650 TABLET ORAL EVERY 6 HOURS PRN
Status: DISCONTINUED | OUTPATIENT
Start: 2023-02-26 | End: 2023-02-26

## 2023-02-26 RX ORDER — ATORVASTATIN CALCIUM 20 MG/1
20 TABLET, FILM COATED ORAL DAILY
COMMUNITY

## 2023-02-26 RX ORDER — PANTOPRAZOLE SODIUM 40 MG/1
40 TABLET, DELAYED RELEASE ORAL
Status: DISCONTINUED | OUTPATIENT
Start: 2023-02-26 | End: 2023-03-02 | Stop reason: HOSPADM

## 2023-02-26 RX ORDER — NALOXONE HYDROCHLORIDE 0.4 MG/ML
0.4 INJECTION, SOLUTION INTRAMUSCULAR; INTRAVENOUS; SUBCUTANEOUS PRN
Status: DISCONTINUED | OUTPATIENT
Start: 2023-02-26 | End: 2023-03-02 | Stop reason: HOSPADM

## 2023-02-26 RX ORDER — ENOXAPARIN SODIUM 100 MG/ML
40 INJECTION SUBCUTANEOUS DAILY
Status: DISCONTINUED | OUTPATIENT
Start: 2023-02-26 | End: 2023-03-02 | Stop reason: HOSPADM

## 2023-02-26 RX ORDER — ORPHENADRINE CITRATE 30 MG/ML
60 INJECTION INTRAMUSCULAR; INTRAVENOUS ONCE
Status: COMPLETED | OUTPATIENT
Start: 2023-02-26 | End: 2023-02-26

## 2023-02-26 RX ORDER — OXYCODONE HYDROCHLORIDE 5 MG/1
5 TABLET ORAL EVERY 6 HOURS PRN
Status: DISCONTINUED | OUTPATIENT
Start: 2023-02-26 | End: 2023-02-26 | Stop reason: SDUPTHER

## 2023-02-26 RX ORDER — KETOROLAC TROMETHAMINE 30 MG/ML
30 INJECTION, SOLUTION INTRAMUSCULAR; INTRAVENOUS EVERY 6 HOURS
Status: COMPLETED | OUTPATIENT
Start: 2023-02-26 | End: 2023-03-01

## 2023-02-26 RX ORDER — METHOCARBAMOL 100 MG/ML
750 INJECTION, SOLUTION INTRAMUSCULAR; INTRAVENOUS ONCE
Status: DISCONTINUED | OUTPATIENT
Start: 2023-02-26 | End: 2023-02-26

## 2023-02-26 RX ORDER — INSULIN GLARGINE 100 [IU]/ML
13 INJECTION, SOLUTION SUBCUTANEOUS DAILY
Status: ON HOLD | COMMUNITY
End: 2023-02-26

## 2023-02-26 RX ORDER — OXYCODONE HYDROCHLORIDE 10 MG/1
10 TABLET ORAL EVERY 6 HOURS PRN
Status: DISCONTINUED | OUTPATIENT
Start: 2023-02-26 | End: 2023-02-26

## 2023-02-26 RX ORDER — ACETAMINOPHEN 650 MG/1
650 SUPPOSITORY RECTAL EVERY 6 HOURS PRN
Status: DISCONTINUED | OUTPATIENT
Start: 2023-02-26 | End: 2023-02-26

## 2023-02-26 RX ORDER — SODIUM CHLORIDE 0.9 % (FLUSH) 0.9 %
5-40 SYRINGE (ML) INJECTION EVERY 12 HOURS SCHEDULED
Status: DISCONTINUED | OUTPATIENT
Start: 2023-02-26 | End: 2023-03-02 | Stop reason: HOSPADM

## 2023-02-26 RX ORDER — SODIUM CHLORIDE 9 MG/ML
INJECTION, SOLUTION INTRAVENOUS CONTINUOUS
Status: DISCONTINUED | OUTPATIENT
Start: 2023-02-26 | End: 2023-02-27

## 2023-02-26 RX ORDER — DIAZEPAM 5 MG/1
5 TABLET ORAL EVERY 6 HOURS PRN
Status: DISCONTINUED | OUTPATIENT
Start: 2023-02-26 | End: 2023-02-26

## 2023-02-26 RX ORDER — SODIUM CHLORIDE 9 MG/ML
INJECTION, SOLUTION INTRAVENOUS PRN
Status: DISCONTINUED | OUTPATIENT
Start: 2023-02-26 | End: 2023-03-02 | Stop reason: HOSPADM

## 2023-02-26 RX ORDER — POLYETHYLENE GLYCOL 3350 17 G/17G
17 POWDER, FOR SOLUTION ORAL DAILY PRN
Status: DISCONTINUED | OUTPATIENT
Start: 2023-02-26 | End: 2023-03-02 | Stop reason: HOSPADM

## 2023-02-26 RX ORDER — VALSARTAN 40 MG/1
40 TABLET ORAL DAILY
Status: ON HOLD | COMMUNITY
End: 2023-02-26

## 2023-02-26 RX ORDER — DEXTROSE MONOHYDRATE 100 MG/ML
INJECTION, SOLUTION INTRAVENOUS CONTINUOUS PRN
Status: DISCONTINUED | OUTPATIENT
Start: 2023-02-26 | End: 2023-03-02 | Stop reason: HOSPADM

## 2023-02-26 RX ORDER — OXYCODONE HYDROCHLORIDE 5 MG/1
5 TABLET ORAL EVERY 4 HOURS PRN
Status: DISCONTINUED | OUTPATIENT
Start: 2023-02-26 | End: 2023-02-26

## 2023-02-26 RX ORDER — OXYCODONE HYDROCHLORIDE 10 MG/1
10 TABLET ORAL EVERY 6 HOURS PRN
Status: DISCONTINUED | OUTPATIENT
Start: 2023-02-26 | End: 2023-02-26 | Stop reason: SDUPTHER

## 2023-02-26 RX ORDER — SODIUM CHLORIDE 0.9 % (FLUSH) 0.9 %
5-40 SYRINGE (ML) INJECTION PRN
Status: DISCONTINUED | OUTPATIENT
Start: 2023-02-26 | End: 2023-03-02 | Stop reason: HOSPADM

## 2023-02-26 RX ORDER — OXYCODONE HYDROCHLORIDE 5 MG/1
5 TABLET ORAL EVERY 6 HOURS PRN
Status: DISCONTINUED | OUTPATIENT
Start: 2023-02-26 | End: 2023-02-26

## 2023-02-26 RX ORDER — MORPHINE SULFATE 2 MG/ML
2 INJECTION, SOLUTION INTRAMUSCULAR; INTRAVENOUS
Status: DISCONTINUED | OUTPATIENT
Start: 2023-02-26 | End: 2023-03-02 | Stop reason: HOSPADM

## 2023-02-26 RX ORDER — OXYCODONE HYDROCHLORIDE 10 MG/1
10 TABLET ORAL EVERY 4 HOURS PRN
Status: DISCONTINUED | OUTPATIENT
Start: 2023-02-26 | End: 2023-03-02 | Stop reason: HOSPADM

## 2023-02-26 RX ORDER — INSULIN LISPRO 100 [IU]/ML
0-4 INJECTION, SOLUTION INTRAVENOUS; SUBCUTANEOUS NIGHTLY
Status: DISCONTINUED | OUTPATIENT
Start: 2023-02-26 | End: 2023-03-02 | Stop reason: HOSPADM

## 2023-02-26 RX ORDER — FLUTICASONE PROPIONATE 50 MCG
1 SPRAY, SUSPENSION (ML) NASAL DAILY
Status: DISCONTINUED | OUTPATIENT
Start: 2023-02-26 | End: 2023-03-02 | Stop reason: HOSPADM

## 2023-02-26 RX ORDER — INSULIN DEGLUDEC 100 U/ML
13 INJECTION, SOLUTION SUBCUTANEOUS EVERY MORNING
COMMUNITY

## 2023-02-26 RX ORDER — INSULIN LISPRO 100 [IU]/ML
0-4 INJECTION, SOLUTION INTRAVENOUS; SUBCUTANEOUS
Status: DISCONTINUED | OUTPATIENT
Start: 2023-02-26 | End: 2023-03-02 | Stop reason: HOSPADM

## 2023-02-26 RX ADMIN — KETOROLAC TROMETHAMINE 30 MG: 30 INJECTION, SOLUTION INTRAMUSCULAR; INTRAVENOUS at 20:24

## 2023-02-26 RX ADMIN — ORPHENADRINE CITRATE 60 MG: 30 INJECTION INTRAMUSCULAR; INTRAVENOUS at 09:33

## 2023-02-26 RX ADMIN — FLUTICASONE PROPIONATE 1 SPRAY: 50 SPRAY, METERED NASAL at 12:54

## 2023-02-26 RX ADMIN — HYDROMORPHONE HYDROCHLORIDE 0.5 MG: 1 INJECTION, SOLUTION INTRAMUSCULAR; INTRAVENOUS; SUBCUTANEOUS at 04:28

## 2023-02-26 RX ADMIN — MORPHINE SULFATE 2 MG: 2 INJECTION, SOLUTION INTRAMUSCULAR; INTRAVENOUS at 11:44

## 2023-02-26 RX ADMIN — HYDROMORPHONE HYDROCHLORIDE 1 MG: 1 INJECTION, SOLUTION INTRAMUSCULAR; INTRAVENOUS; SUBCUTANEOUS at 00:13

## 2023-02-26 RX ADMIN — ACETAMINOPHEN 1000 MG: 500 TABLET ORAL at 08:41

## 2023-02-26 RX ADMIN — OXYCODONE HYDROCHLORIDE 10 MG: 10 TABLET ORAL at 06:37

## 2023-02-26 RX ADMIN — NALOXONE HYDROCHLORIDE 0.4 MG: 0.4 INJECTION, SOLUTION INTRAMUSCULAR; INTRAVENOUS; SUBCUTANEOUS at 00:55

## 2023-02-26 RX ADMIN — METHOCARBAMOL 750 MG: 100 INJECTION INTRAMUSCULAR; INTRAVENOUS at 23:12

## 2023-02-26 RX ADMIN — METHOCARBAMOL 750 MG: 100 INJECTION INTRAMUSCULAR; INTRAVENOUS at 16:50

## 2023-02-26 RX ADMIN — ENOXAPARIN SODIUM 40 MG: 100 INJECTION SUBCUTANEOUS at 08:42

## 2023-02-26 RX ADMIN — SODIUM CHLORIDE: 9 INJECTION, SOLUTION INTRAVENOUS at 18:33

## 2023-02-26 RX ADMIN — NALOXONE HYDROCHLORIDE 0.4 MG: 0.4 INJECTION, SOLUTION INTRAMUSCULAR; INTRAVENOUS; SUBCUTANEOUS at 00:53

## 2023-02-26 RX ADMIN — Medication 10 ML: at 20:26

## 2023-02-26 RX ADMIN — PANTOPRAZOLE SODIUM 40 MG: 40 TABLET, DELAYED RELEASE ORAL at 08:42

## 2023-02-26 RX ADMIN — OXYCODONE HYDROCHLORIDE 5 MG: 5 TABLET ORAL at 12:47

## 2023-02-26 RX ADMIN — SODIUM CHLORIDE: 9 INJECTION, SOLUTION INTRAVENOUS at 09:40

## 2023-02-26 RX ADMIN — SODIUM CHLORIDE 5 ML/HR: 9 INJECTION, SOLUTION INTRAVENOUS at 12:46

## 2023-02-26 RX ADMIN — KETOROLAC TROMETHAMINE 30 MG: 30 INJECTION, SOLUTION INTRAMUSCULAR; INTRAVENOUS at 08:41

## 2023-02-26 RX ADMIN — Medication 10 ML: at 08:43

## 2023-02-26 RX ADMIN — METHOCARBAMOL 500 MG: 100 INJECTION INTRAMUSCULAR; INTRAVENOUS at 05:27

## 2023-02-26 RX ADMIN — METHOCARBAMOL 500 MG: 500 TABLET ORAL at 08:41

## 2023-02-26 RX ADMIN — KETOROLAC TROMETHAMINE 30 MG: 30 INJECTION, SOLUTION INTRAMUSCULAR; INTRAVENOUS at 14:35

## 2023-02-26 RX ADMIN — INSULIN GLARGINE 13 UNITS: 100 INJECTION, SOLUTION SUBCUTANEOUS at 10:01

## 2023-02-26 RX ADMIN — DIAZEPAM 5 MG: 5 TABLET ORAL at 11:45

## 2023-02-26 RX ADMIN — ACETAMINOPHEN 1000 MG: 500 TABLET ORAL at 14:35

## 2023-02-26 RX ADMIN — METHOCARBAMOL 1000 MG: 100 INJECTION INTRAMUSCULAR; INTRAVENOUS at 12:47

## 2023-02-26 RX ADMIN — HYDROMORPHONE HYDROCHLORIDE 1 MG: 1 INJECTION, SOLUTION INTRAMUSCULAR; INTRAVENOUS; SUBCUTANEOUS at 01:34

## 2023-02-26 RX ADMIN — MORPHINE SULFATE 2 MG: 2 INJECTION, SOLUTION INTRAMUSCULAR; INTRAVENOUS at 18:34

## 2023-02-26 RX ADMIN — TAMSULOSIN HYDROCHLORIDE 0.4 MG: 0.4 CAPSULE ORAL at 10:01

## 2023-02-26 RX ADMIN — ACETAMINOPHEN 1000 MG: 500 TABLET ORAL at 20:23

## 2023-02-26 RX ADMIN — OXYCODONE HYDROCHLORIDE 10 MG: 10 TABLET ORAL at 20:57

## 2023-02-26 RX ADMIN — NALOXONE HYDROCHLORIDE 1 MG: 1 INJECTION PARENTERAL at 00:54

## 2023-02-26 ASSESSMENT — PAIN DESCRIPTION - FREQUENCY
FREQUENCY: CONTINUOUS

## 2023-02-26 ASSESSMENT — PAIN DESCRIPTION - ONSET
ONSET: ON-GOING

## 2023-02-26 ASSESSMENT — PAIN SCALES - GENERAL
PAINLEVEL_OUTOF10: 10
PAINLEVEL_OUTOF10: 10
PAINLEVEL_OUTOF10: 7
PAINLEVEL_OUTOF10: 10
PAINLEVEL_OUTOF10: 7
PAINLEVEL_OUTOF10: 10
PAINLEVEL_OUTOF10: 3
PAINLEVEL_OUTOF10: 6
PAINLEVEL_OUTOF10: 10
PAINLEVEL_OUTOF10: 7
PAINLEVEL_OUTOF10: 0
PAINLEVEL_OUTOF10: 6
PAINLEVEL_OUTOF10: 10
PAINLEVEL_OUTOF10: 10
PAINLEVEL_OUTOF10: 6

## 2023-02-26 ASSESSMENT — PAIN DESCRIPTION - DESCRIPTORS
DESCRIPTORS: BURNING
DESCRIPTORS: ACHING;SPASM;SHARP
DESCRIPTORS: ACHING
DESCRIPTORS: ACHING;SPASM;SHARP
DESCRIPTORS: BURNING
DESCRIPTORS: ACHING;DISCOMFORT
DESCRIPTORS: ACHING;SPASM;SHARP

## 2023-02-26 ASSESSMENT — PAIN - FUNCTIONAL ASSESSMENT
PAIN_FUNCTIONAL_ASSESSMENT: PREVENTS OR INTERFERES SOME ACTIVE ACTIVITIES AND ADLS
PAIN_FUNCTIONAL_ASSESSMENT: PREVENTS OR INTERFERES WITH ALL ACTIVE AND SOME PASSIVE ACTIVITIES
PAIN_FUNCTIONAL_ASSESSMENT: PREVENTS OR INTERFERES WITH MANY ACTIVE NOT PASSIVE ACTIVITIES
PAIN_FUNCTIONAL_ASSESSMENT: PREVENTS OR INTERFERES SOME ACTIVE ACTIVITIES AND ADLS
PAIN_FUNCTIONAL_ASSESSMENT: PREVENTS OR INTERFERES WITH ALL ACTIVE AND SOME PASSIVE ACTIVITIES
PAIN_FUNCTIONAL_ASSESSMENT: PREVENTS OR INTERFERES WITH ALL ACTIVE AND SOME PASSIVE ACTIVITIES
PAIN_FUNCTIONAL_ASSESSMENT: 0-10
PAIN_FUNCTIONAL_ASSESSMENT: PREVENTS OR INTERFERES SOME ACTIVE ACTIVITIES AND ADLS
PAIN_FUNCTIONAL_ASSESSMENT: PREVENTS OR INTERFERES SOME ACTIVE ACTIVITIES AND ADLS

## 2023-02-26 ASSESSMENT — PAIN DESCRIPTION - LOCATION
LOCATION: GENERALIZED
LOCATION: GENERALIZED
LOCATION: BACK
LOCATION: GENERALIZED

## 2023-02-26 ASSESSMENT — PAIN DESCRIPTION - PAIN TYPE
TYPE: ACUTE PAIN

## 2023-02-26 ASSESSMENT — PAIN DESCRIPTION - ORIENTATION
ORIENTATION: LOWER
ORIENTATION: LOWER

## 2023-02-26 ASSESSMENT — PAIN SCALES - WONG BAKER
WONGBAKER_NUMERICALRESPONSE: 0

## 2023-02-26 NOTE — PROGRESS NOTES
Medication Reconciliation    List of medications patient is currently taking is complete. Source of information: 1.  Conversation with patient at bedside                                      2. EPIC records

## 2023-02-26 NOTE — ED NOTES
Patient is still hypotensive and bradycardic, MD at bedside. 2 doses of 0.4 narcan IV given with no effect. Another dose of 1mg narcan IV given. Blood sugar is now 159 after 50 % dextrose.      Brenda Lopez RN  02/25/23 5139

## 2023-02-26 NOTE — PLAN OF CARE
Problem: ABCDS Injury Assessment  Goal: Absence of physical injury  Outcome: Progressing     Problem: Pain  Goal: Verbalizes/displays adequate comfort level or baseline comfort level  Outcome: Progressing     Problem: Neurosensory - Adult  Goal: Achieves stable or improved neurological status  Outcome: Progressing

## 2023-02-26 NOTE — ED NOTES
Patients pain has not improved despite all the pain medications given, MD aware.      Donna Sebastian, RN  02/25/23 2015

## 2023-02-26 NOTE — ED NOTES
Patient is hypotensive, hypoglycemic, and bradycardic. Dr. Brian Chang made aware and immediately at bedside to assess patient. He gave this RN verbal order for 1 amp of D50, normal saline bolus, and 0.4 mg of IV narcan.      Vishal Soares RN  02/25/23 8602

## 2023-02-26 NOTE — DISCHARGE INSTRUCTIONS
Per Dr. Jie Galindo recommendation, follow up with neurosurgery or pain management at [facility] for management of Baclofen/pain/possible baclofen pump trial.

## 2023-02-26 NOTE — ED NOTES
POCT- 58   taken at 84 Ewa Lara notified at [facility], [place] [address]  02/25/23 9198

## 2023-02-26 NOTE — ED NOTES
Patient is alert and more awake and restless, complaning of severe pain. Pain medication has been given, patient still restless and still in pain.      Cheryle Laurence, RN  02/26/23 3924

## 2023-02-26 NOTE — ED NOTES
Patient still complaining of pain, patients wife concerned with the amount of medications patient has received in the past few days and pain medications given to the patient today.      Osman Coates RN  02/26/23 8316

## 2023-02-26 NOTE — PROGRESS NOTES
Patient admitted to floor with severe pain. Vital signs stable. Patient flailing in bed and rocking back iand forth. Nurse was barely able to complete admission but did. Assessment is hard to complete due to patient constant moving and pain when being touched. MD called for better pain management and Robaxin and Roxicodone was ordered. Patient has been given multiple options for non pharmacological pain management. Patient only accepted pillows under legs and cold washcloths over body. Anytime you get near patient he is yelling out in pain. Wife at bedside. Pain medication and muscle relaxant given. Will continue to monitor.

## 2023-02-26 NOTE — PROGRESS NOTES
Evening BS check 54, given 4 ounces of juice, recheck in 15 minutes was 57, given 4 more ounces of juice, 15 minutes later recheck was 87. Pt now eating dinner.

## 2023-02-26 NOTE — ED NOTES
Patient states that he is unable to sit or lye in bed. Feels better standing and walking.      Donna Sebastian RN  02/25/23 2017

## 2023-02-26 NOTE — ED PROVIDER NOTES
[address] [facility] ENCOUNTER      Pt Name: Иван Cavazos  MRN: [id number]  [place] 1970  Date of evaluation: 2/25/2023  Provider: Faheem Asher[address] [place] [address]  Chief Complaint   Patient presents with    Other     Muscle spasm starting at 1pm today all over       I have fully participated in the care of Иван Cavazos and have had a face-to-face evaluation. I have reviewed and agree with all pertinent clinical information, and midlevel provider's history, and physical exam. I have also reviewed the treatment plan. I have also reviewed and agree with the medications, allergies and past medical history section for this Иван Cavazos. I agree with the diagnosis, and I concur. This patient is at risk for a communicable infection. Therefore, personal protection equipment consisting of a mask was worn for the exam.    Past Medical History:   Diagnosis Date    Diabetes mellitus ([facility] [address]     Hyperlipidemia        MDM:  Иван Cavazos is a 46 y.o. male who presents with spasms of his back. He has a history of spinal cord injury and has had muscle spasms and spastic paralysis. He states has been out of his oxycodone for 3 days. His baclofen is not helping to prevent some symptoms. His pain is gotten worse. He cannot tolerate anymore. He is pacing throughout the emergency department because his pain is so bad. Physical exam reveals spasms of the upper back. He has contractures secondary to his injury. Patient is pacing and walking throughout the emergency department yelling and screaming. Therefore, he was given large doses of pain medicine to control his pain. We finally got his pain under control with IV medications. However, after his pain was controlled patient became obtunded. He was minimally responsive. At 1 point he seemed to quit breathing to quit breathing and no pulses were palpated.   As soon as chest compressions were started patient woke up and became responsive. He had been given Narcan and Romazicon. He then started having increasing pain. So he was given small increments of Dilaudid IV to control his pain. We spoke to the hospitalist on multiple occasions. Hospitalist 1 and multiple other tests performed in the emergency department including CT of the neck and CT of the cervical spine with laboratory work. None of which was diagnostic. Patient was admitted to the hospital for pain control which was probably secondary to opiate withdrawal.  Patient remained stable throughout the rest of his emergency department stay. He was transferred to [facility] to the care of Dr. José Miguel Nunn:    02/25/23 1818   BP: (!) 175/90   Pulse: (!) 102   Resp: 27   Temp: 97.8 °F (36.6 °C)   SpO2: 97%     Diagnostic considerations include but are not limited to:  Spinal Epidural Abscess, Vertebral Osteomyelitis, Cauda Equina Syndrome, Spinal Cord Compression, Conus Medullaris Syndrome, ruptured/dissecting Abdominal Aortic Aneurysm, Metastases to the back, Kidney Stone, Pyelonephritis, Fracture or dislocation, other      See discharge instructions for specific medications, discharge information, and treatments. They were verbally instructed to return to emergency if any problems. Medications   oxyCODONE-acetaminophen (PERCOCET)  MG per tablet 1 tablet (has no administration in time range)   morphine injection 2 mg (2 mg IntraMUSCular Given 2/25/23 1842)   diazePAM (VALIUM) tablet 5 mg (5 mg Oral Given 2/25/23 1842)   ketorolac (TORADOL) injection 30 mg (30 mg IntraMUSCular Given 2/25/23 1845)   morphine injection 2 mg (2 mg IntraMUSCular Given 2/25/23 1910)       New Prescriptions    No medications on file       The patient's blood pressure was found to be elevated according to CMS/Medicare and the Affordable Care Act/ObamaCare criteria.  Elevated blood pressure could occur because of pain or anxiety or other reasons and does not mean that they need to have their blood pressure treated or medications otherwise adjusted. However, this could also be a sign that they will need to have their blood pressure treated or medications changed. The patient was instructed to follow up closely with their personal physician to have their blood pressure rechecked. The patient was instructed to take a list of recent blood pressure readings to their next visit with their personal physician. IMPRESSION(S):  1. Spasm of muscle    2. Closed fracture of cervical vertebra, subsequent encounter      ? Recheck Times: Multiple (too numerous to count)  Critical care: 45 minutes not including billable procedures.        [facility], DO  02/26/23 6913

## 2023-02-26 NOTE — ED NOTES
Pt sitting on side of bed  Wife at bedside spasm starting to slow down     Benjamin Faustin RN  02/25/23 6292

## 2023-02-26 NOTE — H&P
Hospital Medicine History and Physical    2/26/2023    Date of Admission: 2/26/2023    Date of Service: Pt seen/examined on 2/26/2023 and admitted to inpatient. Assessment/plan:  Diffuse muscle spasm of the back. Patient with history of cervical spinal cord injury. So far, there is no clear etiology. Electrolytes are unremarkable, including normal potassium level. CT-cervical spine revealed status post posterior fusion of C1-C2 that is grossly unremarkable, moderate degenerative disc disease throughout the lower cervical spine with no acute abnormality, moderate disc osteophyte complexes at C5-C6 and C6-C7. CT-lumbar spine with chronic L5 superior endplate Schmorl's node, mild central spinal canal narrowing at L3-L4. Rhabdomyolysis, mild. Secondary to diffuse spasm. Continue intravenous fluid and monitor creatinine kinase closely. History of type 1 diabetes. Check hemoglobin A1c. Continue scheduled Lantus, sliding scale insulin. Monitor Accu-Chek closely and adjust insulin dose as needed. Other comorbidities: history of hyperlipidemia, cervical spinal cord injury, underweight with BMI of 19 kg/m². Activities: Up with assist  Prophylaxis: Subcutaneous Lovenox  Code status: Full code    ==========================================================  Chief complaint:  Chief Complaint   Patient presents with    Other     Muscle spasm starting at 1pm today all over       History of Presenting Illness: This is a pleasant 70-year-old female with history of type I diabetes type 2, hyperlipidemia, cervical spinal cord injury, who presents to the emergency room with complaints of intractable diffuse back spasm, onset around 1 PM on 2/25/2023. He denies new injury. While in the emergency room, he was tried multiple IV narcotics, including Dilaudid, with significant improvement of symptoms. He was also tried on muscle relaxants with no improvement of symptoms.   He received a dose of Valium, with some improvement. Following multiple narcotics, he also required multiple doses of IV Narcan due to somnolence. Past Medical History:      Diagnosis Date    Cervical spinal cord injury ([address]     Diabetes mellitus ([address]     Hyperlipidemia        Past Surgical History:      Procedure Laterality Date    ANTERIOR CRUCIATE LIGAMENT REPAIR  7/28/2011    right    CLAVICLE SURGERY  2006    HIP SURGERY  1989    pins       Medications (prior to admission):  Prior to Admission medications    Medication Sig Start Date End Date Taking? Authorizing Provider   baclofen (LIORESAL) 10 MG tablet Take 10 mg by mouth 4 times daily as needed 12/13/22 3/13/23 Yes Historical Provider, MD   oxyCODONE (ROXICODONE) 5 MG immediate release tablet Take 5 mg by mouth 3 times daily as needed. 2/24/23 3/26/23 Yes Historical Provider, MD   insulin aspart (NOVOLOG) 100 UNIT/ML injection pen Inject 5-10 Units into the skin 4 times daily (after meals and at bedtime) 1/26/22  Yes Historical Provider, MD   tamsulosin (FLOMAX) 0.4 MG capsule Take 0.4 mg by mouth daily 1/10/23  Yes Historical Provider, MD   Continuous Blood Gluc Sensor (DEXCOM G6 SENSOR) MISC USE 1 EVERY 10 DAYS 2/6/23   Historical Provider, MD   Continuous Blood Gluc Transmit (DEXCOM G6 TRANSMITTER) MISC USE 1 EVERY 90 DAYS 2/4/23   Historical Provider, MD   LANTUS 100 UNIT/ML injection  2/8/12   Historical Provider, MD   HUMALOG 100 UNIT/ML injection  1/23/12   Historical Provider, MD   SIMVASTATIN PO Take 20 mg by mouth    Historical Provider, MD       Allergy(ies):  Patient has no known allergies. Social History:  TOBACCO:  reports that he quit smoking about 22 years ago. He has never used smokeless tobacco.  ETOH:  reports current alcohol use. Family History:      Problem Relation Age of Onset    Diabetes Paternal Grandmother        Review of Systems:  Pertinent positives are listed in HPI. At least 10-point ROS reviewed and were negative.      Vitals and physical examination:  BP (!) 155/99   Pulse 100   Temp 98.3 °F (36.8 °C) (Oral)   Resp 18   Ht 6' (1.829 m)   Wt 140 lb (63.5 kg)   SpO2 98%   BMI 18.99 kg/m²   Gen/overall appearance: In mild distress. Alert. Oriented x3  Head: Normocephalic, atraumatic  Eyes: EOMI, good acuity  ENT: Oral mucosa moist  Neck: No JVD, thyromegaly  CVS: Nml S1S2, no MRG, RRR  Pulm: Clear bilaterally. No crackles/wheezes  Gastrointestinal: Soft, NT/ND, +BS  Musculoskeletal: No edema. Warm  Neuro: No focal deficit. Moves extremity spontaneously. Psychiatry: Appropriate affect. Not agitated. Skin: Warm, dry with normal turgor. No rash  Capillary refill: Brisk,< 3 seconds   Peripheral Pulses: +2 palpable, equal bilaterally       Labs/imaging/EKG:  CBC:   Recent Labs     02/25/23 2107   WBC 9.7   HGB 13.5        BMP:    Recent Labs     02/25/23 2107      K 4.3      CO2 26   BUN 9   CREATININE 1.0   GLUCOSE 73     Hepatic:   Recent Labs     02/25/23 2107   AST 21   ALT 10   BILITOT 0.7   ALKPHOS 69       CT CERVICAL SPINE WO CONTRAST    Result Date: 2/26/2023  EXAMINATION: CT OF THE CERVICAL SPINE WITHOUT CONTRAST 2/26/2023 12:46 am TECHNIQUE: CT of the cervical spine was performed without the administration of intravenous contrast. Multiplanar reformatted images are provided for review. Automated exposure control, iterative reconstruction, and/or weight based adjustment of the mA/kV was utilized to reduce the radiation dose to as low as reasonably achievable. COMPARISON: None. HISTORY: ORDERING SYSTEM PROVIDED HISTORY: Neck and back pain, no injury TECHNOLOGIST PROVIDED HISTORY: Reason for exam:->Neck and back pain, no injury Decision Support Exception - unselect if not a suspected or confirmed emergency medical condition->Emergency Medical Condition (MA) Reason for Exam: Neck and back pain Relevant Medical/Surgical History: Neck and shoulder surgery FINDINGS: BONES/ALIGNMENT: The cervical vertebra are well aligned. There is moderate disc space narrowing throughout the lower cervical spine which is most prominent at C5-6 and C6-7 with prominent osteophytes at both levels. No fracture or subluxation is seen. There are pedicle screws extending through the lateral masses of C1 and the pedicles of C2 posteriorly which appear intact in good position and are held in place with surgical rods posteriorly. There is no evidence of loosening and the screws are intact. The atlantoaxial joint is otherwise intact. DEGENERATIVE CHANGES: There are moderate disc osteophyte complexes at C5-6 and C6-7 causing moderate anterior dural sac effacement at both levels. And mild uncinate spurring causing mild neural foraminal narrowing bilaterally at both levels. Posterior elements are intact SOFT TISSUES: There is no prevertebral soft tissue swelling. There is mild biapical pleural thickening and scarring. Status post posterior fusion of C1-2 which is grossly unremarkable. Moderate degenerative disc changes throughout the lower cervical spine with no acute abnormality seen. Moderate disc osteophyte complexes at C5-6 and C6-7. If the patient is persistently symptomatic, suggest MRI for further characterization these disc space levels. CT LUMBAR SPINE WO CONTRAST    Result Date: 2/26/2023  EXAMINATION: CT OF THE LUMBAR SPINE WITHOUT CONTRAST  2/25/2023 TECHNIQUE: CT of the lumbar spine was performed without the administration of intravenous contrast. Multiplanar reformatted images are provided for review. Adjustment of mA and/or kV according to patient size was utilized. Automated exposure control, iterative reconstruction, and/or weight based adjustment of the mA/kV was utilized to reduce the radiation dose to as low as reasonably achievable.  COMPARISON: None HISTORY: ORDERING SYSTEM PROVIDED HISTORY: Low back pain TECHNOLOGIST PROVIDED HISTORY: Reason for exam:->Low back pain Decision Support Exception - unselect if not a suspected or confirmed emergency medical condition->Emergency Medical Condition (MA) Reason for Exam: Lower back pain FINDINGS: BONES/ALIGNMENT: The spine is labeled such that there is a transitional L5 type vertebral body. Alignment of the lumbar spine is normal.  There is a chronic L5 superior endplate Schmorl's node. There is no evidence of an acute fracture. DEGENERATIVE CHANGES: Mild multilevel degenerative disc disease is noted in the lumbar spine, greatest at L4-L5 with associated vacuum phenomenon in the disc space. SOFT TISSUES/RETROPERITONEUM: Stool is noted in the colon. Vascular calcifications are noted in the aorta and its branch vessels. Gravity dependent atelectasis is noted in the lungs. L1-L2: No disc herniation, central spinal canal narrowing, or foraminal narrowing. L2-L3: There is a minimal disc bulge. Minimal central spinal canal narrowing. No foraminal narrowing. L3-L4: There is a circumferential disc bulge. Mild central spinal canal narrowing. Bilateral facet arthropathy is noted. Mild bilateral foraminal narrowing, asymmetric on the left. L4-L5: There is a circumferential disc bulge. Bilateral facet arthropathy. No central spinal canal narrowing. Moderate-severe right and mild left foraminal narrowing. L5-S1: No disc herniation, central spinal canal narrowing, or foraminal narrowing. 1. Chronic L5 superior endplate Schmorl's node. 2. Mild central spinal canal narrowing at L3-L4. 3. Foraminal narrowing, as above. XR CHEST 1 VIEW    Result Date: 2/25/2023  EXAMINATION: ONE XRAY VIEW OF THE CHEST 2/25/2023 5:43 pm COMPARISON: None. HISTORY: ORDERING SYSTEM PROVIDED HISTORY: Intractable pain TECHNOLOGIST PROVIDED HISTORY: Reason for exam:->Intractable pain Reason for Exam: Intractable pain FINDINGS: The cardial-pericardial silhouette is unremarkable in appearance. The lungs are clear. No pneumothorax is found. No free air is seen. No acute bony abnormality. Remote left clavicle ORIF noted. No acute abnormality identified.        Thank you Lary Stevens for the opportunity to be involved in this patient's care.    -----------------------------  Shreyas Cordero MD  [facility]

## 2023-02-27 LAB
A/G RATIO: 1.5 (ref 1.1–2.2)
ALBUMIN SERPL-MCNC: 3.2 G/DL (ref 3.4–5)
ALP BLD-CCNC: 54 U/L (ref 40–129)
ALT SERPL-CCNC: 14 U/L (ref 10–40)
ANION GAP SERPL CALCULATED.3IONS-SCNC: 11 MMOL/L (ref 3–16)
AST SERPL-CCNC: 36 U/L (ref 15–37)
BASOPHILS ABSOLUTE: 0 K/UL (ref 0–0.2)
BASOPHILS RELATIVE PERCENT: 0.4 %
BILIRUB SERPL-MCNC: 0.6 MG/DL (ref 0–1)
BUN BLDV-MCNC: 11 MG/DL (ref 7–20)
CALCIUM SERPL-MCNC: 8.6 MG/DL (ref 8.3–10.6)
CHLORIDE BLD-SCNC: 108 MMOL/L (ref 99–110)
CO2: 22 MMOL/L (ref 21–32)
CREAT SERPL-MCNC: 0.9 MG/DL (ref 0.9–1.3)
EOSINOPHILS ABSOLUTE: 0.1 K/UL (ref 0–0.6)
EOSINOPHILS RELATIVE PERCENT: 1.4 %
GFR SERPL CREATININE-BSD FRML MDRD: >60 ML/MIN/{1.73_M2}
GLUCOSE BLD-MCNC: 103 MG/DL (ref 70–99)
GLUCOSE BLD-MCNC: 114 MG/DL (ref 70–99)
GLUCOSE BLD-MCNC: 156 MG/DL (ref 70–99)
GLUCOSE BLD-MCNC: 191 MG/DL (ref 70–99)
GLUCOSE BLD-MCNC: 198 MG/DL (ref 70–99)
GLUCOSE BLD-MCNC: 67 MG/DL (ref 70–99)
GLUCOSE BLD-MCNC: 87 MG/DL (ref 70–99)
GLUCOSE BLD-MCNC: 98 MG/DL (ref 70–99)
HCT VFR BLD CALC: 39.5 % (ref 40.5–52.5)
HEMOGLOBIN: 13.2 G/DL (ref 13.5–17.5)
LYMPHOCYTES ABSOLUTE: 1.3 K/UL (ref 1–5.1)
LYMPHOCYTES RELATIVE PERCENT: 17.8 %
MAGNESIUM: 2.2 MG/DL (ref 1.8–2.4)
MCH RBC QN AUTO: 30.1 PG (ref 26–34)
MCHC RBC AUTO-ENTMCNC: 33.4 G/DL (ref 31–36)
MCV RBC AUTO: 90.1 FL (ref 80–100)
MONOCYTES ABSOLUTE: 0.4 K/UL (ref 0–1.3)
MONOCYTES RELATIVE PERCENT: 5.2 %
NEUTROPHILS ABSOLUTE: 5.7 K/UL (ref 1.7–7.7)
NEUTROPHILS RELATIVE PERCENT: 75.2 %
PDW BLD-RTO: 14.6 % (ref 12.4–15.4)
PERFORMED ON: ABNORMAL
PERFORMED ON: NORMAL
PHOSPHORUS: 4.4 MG/DL (ref 2.5–4.9)
PLATELET # BLD: 149 K/UL (ref 135–450)
PMV BLD AUTO: 9.2 FL (ref 5–10.5)
POTASSIUM SERPL-SCNC: 3.5 MMOL/L (ref 3.5–5.1)
RBC # BLD: 4.39 M/UL (ref 4.2–5.9)
SODIUM BLD-SCNC: 141 MMOL/L (ref 136–145)
TOTAL CK: 878 U/L (ref 39–308)
TOTAL PROTEIN: 5.4 G/DL (ref 6.4–8.2)
WBC # BLD: 7.5 K/UL (ref 4–11)

## 2023-02-27 PROCEDURE — 2060000000 HC ICU INTERMEDIATE R&B

## 2023-02-27 PROCEDURE — 82550 ASSAY OF CK (CPK): CPT

## 2023-02-27 PROCEDURE — 6370000000 HC RX 637 (ALT 250 FOR IP): Performed by: INTERNAL MEDICINE

## 2023-02-27 PROCEDURE — 85025 COMPLETE CBC W/AUTO DIFF WBC: CPT

## 2023-02-27 PROCEDURE — 36415 COLL VENOUS BLD VENIPUNCTURE: CPT

## 2023-02-27 PROCEDURE — 83735 ASSAY OF MAGNESIUM: CPT

## 2023-02-27 PROCEDURE — 6360000002 HC RX W HCPCS: Performed by: STUDENT IN AN ORGANIZED HEALTH CARE EDUCATION/TRAINING PROGRAM

## 2023-02-27 PROCEDURE — 2580000003 HC RX 258: Performed by: STUDENT IN AN ORGANIZED HEALTH CARE EDUCATION/TRAINING PROGRAM

## 2023-02-27 PROCEDURE — 2580000003 HC RX 258: Performed by: INTERNAL MEDICINE

## 2023-02-27 PROCEDURE — 84100 ASSAY OF PHOSPHORUS: CPT

## 2023-02-27 PROCEDURE — 6360000002 HC RX W HCPCS: Performed by: INTERNAL MEDICINE

## 2023-02-27 PROCEDURE — 80053 COMPREHEN METABOLIC PANEL: CPT

## 2023-02-27 RX ORDER — INSULIN LISPRO 100 [IU]/ML
3 INJECTION, SOLUTION INTRAVENOUS; SUBCUTANEOUS
Status: DISCONTINUED | OUTPATIENT
Start: 2023-02-27 | End: 2023-03-02 | Stop reason: HOSPADM

## 2023-02-27 RX ORDER — SODIUM CHLORIDE AND POTASSIUM CHLORIDE 300; 900 MG/100ML; MG/100ML
INJECTION, SOLUTION INTRAVENOUS CONTINUOUS
Status: DISCONTINUED | OUTPATIENT
Start: 2023-02-27 | End: 2023-02-28

## 2023-02-27 RX ORDER — DIPHENHYDRAMINE HCL 25 MG
25 TABLET ORAL NIGHTLY PRN
Status: DISCONTINUED | OUTPATIENT
Start: 2023-02-27 | End: 2023-03-02 | Stop reason: HOSPADM

## 2023-02-27 RX ORDER — GABAPENTIN 100 MG/1
100 CAPSULE ORAL 3 TIMES DAILY
Status: DISCONTINUED | OUTPATIENT
Start: 2023-02-27 | End: 2023-03-02 | Stop reason: HOSPADM

## 2023-02-27 RX ORDER — POTASSIUM CHLORIDE 20 MEQ/1
40 TABLET, EXTENDED RELEASE ORAL ONCE
Status: DISCONTINUED | OUTPATIENT
Start: 2023-02-27 | End: 2023-02-27

## 2023-02-27 RX ADMIN — Medication 10 ML: at 08:35

## 2023-02-27 RX ADMIN — OXYCODONE HYDROCHLORIDE 10 MG: 10 TABLET ORAL at 02:48

## 2023-02-27 RX ADMIN — INSULIN LISPRO 3 UNITS: 100 INJECTION, SOLUTION INTRAVENOUS; SUBCUTANEOUS at 18:19

## 2023-02-27 RX ADMIN — POTASSIUM CHLORIDE AND SODIUM CHLORIDE: 900; 300 INJECTION, SOLUTION INTRAVENOUS at 15:05

## 2023-02-27 RX ADMIN — ACETAMINOPHEN 1000 MG: 500 TABLET ORAL at 21:33

## 2023-02-27 RX ADMIN — KETOROLAC TROMETHAMINE 30 MG: 30 INJECTION, SOLUTION INTRAMUSCULAR; INTRAVENOUS at 16:10

## 2023-02-27 RX ADMIN — FLUTICASONE PROPIONATE 1 SPRAY: 50 SPRAY, METERED NASAL at 08:35

## 2023-02-27 RX ADMIN — TAMSULOSIN HYDROCHLORIDE 0.4 MG: 0.4 CAPSULE ORAL at 08:34

## 2023-02-27 RX ADMIN — METHOCARBAMOL 750 MG: 100 INJECTION INTRAMUSCULAR; INTRAVENOUS at 23:58

## 2023-02-27 RX ADMIN — KETOROLAC TROMETHAMINE 30 MG: 30 INJECTION, SOLUTION INTRAMUSCULAR; INTRAVENOUS at 08:34

## 2023-02-27 RX ADMIN — ACETAMINOPHEN 1000 MG: 500 TABLET ORAL at 05:23

## 2023-02-27 RX ADMIN — ACETAMINOPHEN 1000 MG: 500 TABLET ORAL at 15:01

## 2023-02-27 RX ADMIN — METHOCARBAMOL 750 MG: 100 INJECTION INTRAMUSCULAR; INTRAVENOUS at 05:27

## 2023-02-27 RX ADMIN — GABAPENTIN 100 MG: 100 CAPSULE ORAL at 15:01

## 2023-02-27 RX ADMIN — PANTOPRAZOLE SODIUM 40 MG: 40 TABLET, DELAYED RELEASE ORAL at 05:23

## 2023-02-27 RX ADMIN — INSULIN LISPRO 3 UNITS: 100 INJECTION, SOLUTION INTRAVENOUS; SUBCUTANEOUS at 15:42

## 2023-02-27 RX ADMIN — INSULIN GLARGINE 7 UNITS: 100 INJECTION, SOLUTION SUBCUTANEOUS at 08:35

## 2023-02-27 RX ADMIN — ENOXAPARIN SODIUM 40 MG: 100 INJECTION SUBCUTANEOUS at 08:34

## 2023-02-27 RX ADMIN — OXYCODONE HYDROCHLORIDE 15 MG: 5 TABLET ORAL at 20:19

## 2023-02-27 RX ADMIN — DIAZEPAM 10 MG: 5 TABLET ORAL at 12:38

## 2023-02-27 RX ADMIN — METHOCARBAMOL 750 MG: 100 INJECTION INTRAMUSCULAR; INTRAVENOUS at 19:15

## 2023-02-27 RX ADMIN — MORPHINE SULFATE 2 MG: 2 INJECTION, SOLUTION INTRAMUSCULAR; INTRAVENOUS at 04:00

## 2023-02-27 RX ADMIN — METHOCARBAMOL 750 MG: 100 INJECTION INTRAMUSCULAR; INTRAVENOUS at 12:58

## 2023-02-27 RX ADMIN — KETOROLAC TROMETHAMINE 30 MG: 30 INJECTION, SOLUTION INTRAMUSCULAR; INTRAVENOUS at 01:58

## 2023-02-27 RX ADMIN — OXYCODONE HYDROCHLORIDE 10 MG: 10 TABLET ORAL at 08:34

## 2023-02-27 RX ADMIN — OXYCODONE HYDROCHLORIDE 10 MG: 10 TABLET ORAL at 23:59

## 2023-02-27 RX ADMIN — KETOROLAC TROMETHAMINE 30 MG: 30 INJECTION, SOLUTION INTRAMUSCULAR; INTRAVENOUS at 20:19

## 2023-02-27 RX ADMIN — GABAPENTIN 100 MG: 100 CAPSULE ORAL at 20:19

## 2023-02-27 ASSESSMENT — PAIN DESCRIPTION - DESCRIPTORS
DESCRIPTORS: SHOOTING
DESCRIPTORS: DISCOMFORT
DESCRIPTORS: STABBING
DESCRIPTORS: ACHING;DISCOMFORT
DESCRIPTORS: BURNING;SPASM
DESCRIPTORS: ACHING;DISCOMFORT
DESCRIPTORS: DISCOMFORT
DESCRIPTORS: BURNING;SHOOTING;SPASM

## 2023-02-27 ASSESSMENT — PAIN SCALES - WONG BAKER
WONGBAKER_NUMERICALRESPONSE: 0

## 2023-02-27 ASSESSMENT — PAIN SCALES - GENERAL
PAINLEVEL_OUTOF10: 6
PAINLEVEL_OUTOF10: 7
PAINLEVEL_OUTOF10: 6
PAINLEVEL_OUTOF10: 8
PAINLEVEL_OUTOF10: 7
PAINLEVEL_OUTOF10: 8
PAINLEVEL_OUTOF10: 7

## 2023-02-27 ASSESSMENT — PAIN DESCRIPTION - PAIN TYPE: TYPE: CHRONIC PAIN

## 2023-02-27 ASSESSMENT — PAIN DESCRIPTION - LOCATION
LOCATION: GENERALIZED
LOCATION: BACK;GENERALIZED
LOCATION: BACK
LOCATION: GENERALIZED

## 2023-02-27 ASSESSMENT — PAIN DESCRIPTION - ORIENTATION
ORIENTATION: LOWER
ORIENTATION: LOWER
ORIENTATION: MID;LOWER
ORIENTATION: MID

## 2023-02-27 ASSESSMENT — PAIN DESCRIPTION - FREQUENCY: FREQUENCY: CONTINUOUS

## 2023-02-27 ASSESSMENT — PAIN - FUNCTIONAL ASSESSMENT
PAIN_FUNCTIONAL_ASSESSMENT: PREVENTS OR INTERFERES SOME ACTIVE ACTIVITIES AND ADLS
PAIN_FUNCTIONAL_ASSESSMENT: ACTIVITIES ARE NOT PREVENTED
PAIN_FUNCTIONAL_ASSESSMENT: PREVENTS OR INTERFERES SOME ACTIVE ACTIVITIES AND ADLS
PAIN_FUNCTIONAL_ASSESSMENT: ACTIVITIES ARE NOT PREVENTED

## 2023-02-27 NOTE — PLAN OF CARE
[place] neurosurgery note    Called to see patient due to diffuse back spasms. In talking with patient, he reported he saw Dr. Katerina Villalobos this past Friday and brought him all of his scans. Recommend consulting Dr. Katerina Villalobos given he has his imaging and has seen him very recently.       Stacey Scott MD

## 2023-02-27 NOTE — PROGRESS NOTES
[facility] Medicine Progress Note     Date:  2/27/2023    PCP: Gretta Landau (Tel: [phone])    Date of Admission: 2/25/2023    Chief complaint:   Chief Complaint   Patient presents with    Other     Muscle spasm starting at 1pm today all over       Brief admission history: 51-year-old female with history of type I diabetes type 2, hyperlipidemia, cervical spinal cord injury, who presents to the emergency room with complaints of intractable diffuse back spasm, onset around 1 PM on 2/25/2023. He denies new injury. While in the emergency room, he was tried multiple IV narcotics, including Dilaudid, with significant improvement of symptoms. He was also tried on muscle relaxants with no improvement of symptoms. He received a dose of Valium, with some improvement. Following multiple narcotics, he also required multiple doses of IV Narcan due to respiratory depression. Assessment/plan:  Diffuse muscle spasm of the back. Patient with history of cervical spinal cord injury. CT-cervical spine revealed status post posterior fusion of C1-C2 that is grossly unremarkable, moderate degenerative disc disease throughout the lower cervical spine with no acute abnormality, moderate disc osteophyte complexes at C5-C6 and C6-C7. CT-lumbar spine with chronic L5 superior endplate Schmorl's node, mild central spinal canal narrowing at L3-L4. MRI-cervical spine with syringomyelia. MRI-thoracic and lumbar spine with no significant finding to explain ongoing spasms. Neurosurgery has been consulted to assist with evaluation, await noted cervical syringomyelia. Patient is on multiple medications, including Robaxin, Valium, oxycodone, morphine, IV Toradol, Neurontin. He did not have much improvement following a dose of Norflex. Of note, patient did have respiratory depression requiring about 4 doses of IV Narcan in the emergency room. Hence, we have to be careful with multiple sedating medications.   Rhabdomyolysis, mild.  Secondary to diffuse spasm. Continue intravenous fluid and monitor creatinine kinase closely. History of type 1 diabetes. Hemoglobin A1c pending. Continue scheduled Lantus, sliding scale insulin. Monitor Accu-Chek closely and adjust insulin dose as needed. He has had hypoglycemic episodes due to decreased oral intake. Scheduled Lantus dose has been decreased. Other comorbidities: history of hyperlipidemia, cervical spinal cord injury, underweight with BMI of 19 kg/m². Diet: ADULT DIET; Regular; 4 carb choices (60 gm/meal)    Code status: Full Code   ----------  Subjective  Continues to report ongoing back spasms. Continues to be frustrated because we cannot \"knock him out. \"    Objective  Physical exam:  Vitals: /84   Pulse 70   Temp 98.3 °F (36.8 °C) (Oral)   Resp 19   Ht 6' (1.829 m)   Wt 138 lb 14.2 oz (63 kg)   SpO2 96%   BMI 18.84 kg/m²   Gen/overall appearance: Not in acute distress. Alert. Oriented x3. Head: Normocephalic, atraumatic  Eyes: EOMI, good acuity  ENT: Oral mucosa moist  Neck: No JVD, thyromegaly  CVS: Nml S1S2, no MRG, RRR  Pulm: Clear bilaterally. No crackles/wheezes  Gastrointestinal: Soft, NT/ND, +BS  Musculoskeletal: No edema. Warm  Neuro: No focal deficit. Moves extremity spontaneously. Psychiatry: Appropriate affect. Not agitated. Skin: Warm, dry with normal turgor. No rash  Capillary refill: Brisk,< 3 seconds   Peripheral Pulses: +2 palpable, equal bilaterally      24HR INTAKE/OUTPUT:    Intake/Output Summary (Last 24 hours) at 2/27/2023 1218  Last data filed at 2/27/2023 1040  Gross per 24 hour   Intake 1740.69 ml   Output 150 ml   Net 1590.69 ml     I/O last 3 completed shifts: In: 2554.2 [P.O.:1260; I.V.:1094; IV Piggyback:200.1]  Out: 150 [Urine:150]  No intake/output data recorded.     Meds:    potassium chloride  40 mEq Oral Once    oxyCODONE  15 mg Oral Nightly    gabapentin  100 mg Oral TID    sodium chloride flush  5-40 mL IntraVENous 2 times per day    enoxaparin  40 mg SubCUTAneous Daily    insulin lispro  0-4 Units SubCUTAneous TID WC    insulin lispro  0-4 Units SubCUTAneous Nightly    acetaminophen  1,000 mg Oral 3 times per day    lidocaine  1 patch TransDERmal Daily    ketorolac  30 mg IntraVENous Q6H    pantoprazole  40 mg Oral QAM AC    methocarbamol IVPB  750 mg IntraVENous Q6H    tamsulosin  0.4 mg Oral Daily    fluticasone  1 spray Each Nostril Daily    insulin glargine  7 Units SubCUTAneous Daily     Infusions:    sodium chloride Stopped (02/26/23 1249)    dextrose      sodium chloride Stopped (02/26/23 5837)     PRN Meds: naloxone, sodium chloride flush, sodium chloride, polyethylene glycol, ondansetron, glucose, dextrose bolus **OR** dextrose bolus, glucagon (rDNA), dextrose, morphine, oxyCODONE, diazePAM    Labs/imaging:  CBC:   Recent Labs     02/25/23 2107 02/27/23  0710   WBC 9.7 7.5   HGB 13.5 13.2*    149     BMP:    Recent Labs     02/25/23 2107 02/27/23  0710    141   K 4.3 3.5    108   CO2 26 22   BUN 9 11   CREATININE 1.0 0.9   GLUCOSE 73 87     Hepatic:   Recent Labs     02/25/23 2107 02/27/23  0710   AST 21 36   ALT 10 14   BILITOT 0.7 0.6   ALKPHOS 71 54       Bria Benton MD  -------------------------------  Rounding hospitalist

## 2023-02-27 NOTE — PROGRESS NOTES
4 Eyes Skin Assessment     NAME:  Polly Ramirez  YOB: 1970  MEDICAL RECORD NUMBER:  [id number]    The patient is being assessed for  Admission    I agree that One RN has performed a thorough Head to Toe Skin Assessment on the patient. ALL assessment sites listed below have been assessed. Areas assessed by both nurses:    Head, Face, Ears, Shoulders, Back, Chest, Arms, Elbows, Hands, Sacrum. Buttock, Coccyx, Ischium, and Legs. Feet and Heels        Does the Patient have a Wound?  No noted wound(s)       Jamison Prevention initiated by RN: NA   Wound Care Orders initiated by RN: NA    Pressure Injury (Stage 3,4, Unstageable, DTI, NWPT, and Complex wounds) if present, place referral order by RN under : NA    New and Established Ostomies, if present place, referral order under : NA      Nurse 1 eSignature: Electronically signed by Maude Cavazos RN on 2/27/23 at 6:08 AM EST    **SHARE this note so that the co-signing nurse can place an eSignature**    Nurse 2 eSignature: Electronically signed by Morenita Carson RN on 2/27/23 at 6:22 AM EST

## 2023-02-27 NOTE — CARE COORDINATION
Case Management Assessment  Initial Evaluation    Date/Time of Evaluation: 2/27/2023 11:34 AM  Assessment Completed by: PRIYANKA Hernández    If patient is discharged prior to next notation, then this note serves as note for discharge by case management. Patient Name: Ajay Wayne                   YOB: 1970  Diagnosis: Spasm of muscle [M62.838]  Intractable back pain [M54.9]  Closed fracture of cervical vertebra, subsequent encounter [S12. 9XXD]                   Date / Time: 2/25/2023  6:16 PM    Patient Admission Status: Inpatient   Readmission Risk (Low < 19, Mod (19-27), High > 27): Readmission Risk Score: 9.9    Current PCP: Sneha Arshad  PCP verified by CM? Yes    Chart Reviewed: Yes      History Provided by: Patient  Patient Orientation: Alert and Oriented    Patient Cognition: Alert    Hospitalization in the last 30 days (Readmission):  No    If yes, Readmission Assessment in CM Navigator will be completed. Advance Directives:      Code Status: Full Code   Patient's Primary Decision Maker is:        Discharge Planning:    Patient lives with: Spouse/Significant Other Type of Home: House  Primary Care Giver: Self  Patient Support Systems include: Spouse/Significant Other   Current Financial resources: None  Current community resources: None  Current services prior to admission: None            Current DME:              Type of Home Care services:  None    ADLS  Prior functional level: Independent in ADLs/IADLs  Current functional level: Independent in ADLs/IADLs    PT AM-PAC:   /24  OT AM-PAC:   /24    Family can provide assistance at DC: Yes  Would you like Case Management to discuss the discharge plan with any other family members/significant others, and if so, who? Yes (wife.)  Plans to Return to Present Housing: Yes  Other Identified Issues/Barriers to RETURNING to current housing: None noted at this time. Potential Assistance needed at discharge:  Outpatient PT/OT Potential DME:    Patient expects to discharge to: [facility] for transportation at discharge:      Financial    Payor: Mari Maldonado / Plan: Mari Maldonado / Product Type: *No Product type* /     Does insurance require precert for SNF: Yes    Potential assistance Purchasing Medications: No  Meds-to-Beds request: Yes      [facility] [place] [id number]  Phone: [phone] Fax: [phone]      Notes:    Factors facilitating achievement of predicted outcomes: Family support    Barriers to discharge: None noted at this time. Additional Case Management Notes:   1) Waiting on neurology and neurosurgery clearance. 2)  Possible therapy (not ordered) needs. The Plan for Transition of Care is related to the following treatment goals of Spasm of muscle [M62.838]  Intractable back pain [M54.9]  Closed fracture of cervical vertebra, subsequent encounter [S12. 9XXD]    Respectfully submitted,    PRIYANKA Burns  [facility]   [phone]    Electronically signed by PRIYANKA Albrecht on 2/27/2023 at 11:35 AM

## 2023-02-27 NOTE — PROGRESS NOTES
Physician Progress Note      PATIENTCherylene Grayer  CSN #:                  [id number]  :                       1970  ADMIT DATE:       2023 6:16 PM  [address] [place] DATE:  RESPONDING  PROVIDER #:        Calixto Engel MD          QUERY TEXT:    Pt with history of cervical spinal cord injury admitted diffuse muscle spasms   of back. Noted to have rhabdomyolysis. If possible, please document in   progress notes and discharge summary if you are evaluating and/or treating any   of the following: The medical record reflects the following:  Risk Factors: diffuse muscle spasms of back  Clinical Indicators:   Treatment: IV fluids    Per [email or web address]  Traumatic rhabdomyolysis cause examples: crush syndrome, prolonged   immobilization  Nontraumatic rhabdomyolysis cause examples:  marked exertion, hyperthermia,   metabolic myopathy, drugs or toxins, infections, electrolyte disorders. Thank you,  Gemini Logan RN, BSN, CCDS  [email or web address]  Options provided:  -- Traumatic rhabdomyolysis  -- Nontraumatic rhabdomyolysis  -- Other - I will add my own diagnosis  -- Disagree - Not applicable / Not valid  -- Disagree - Clinically unable to determine / Unknown  -- Refer to Clinical Documentation Reviewer    PROVIDER RESPONSE TEXT:    This patient has nontraumatic rhabdomyolysis.     Query created by: Jolie García on 2023 11:22 AM      Electronically signed by:  Calixto Engel MD 2023 12:41 PM

## 2023-02-27 NOTE — PROGRESS NOTES
[facility]  Glycemic Control       NAME: Wendi Swan  MEDICAL RECORD NUMBER:  [id number]  AGE: 46 y.o. GENDER: male  : 1970  EPISODE DATE:  2023     Data     Recent Labs     23  2309 23  2336 23  0158 23  0522 23  0738 23  1126   POCGLU 73 107* 98 67* 103* 198*       Medications  Scheduled Medications:   potassium chloride  40 mEq Oral Once    oxyCODONE  15 mg Oral Nightly    gabapentin  100 mg Oral TID    sodium chloride flush  5-40 mL IntraVENous 2 times per day    enoxaparin  40 mg SubCUTAneous Daily    insulin lispro  0-4 Units SubCUTAneous TID WC    insulin lispro  0-4 Units SubCUTAneous Nightly    acetaminophen  1,000 mg Oral 3 times per day    lidocaine  1 patch TransDERmal Daily    ketorolac  30 mg IntraVENous Q6H    pantoprazole  40 mg Oral QAM AC    methocarbamol IVPB  750 mg IntraVENous Q6H    tamsulosin  0.4 mg Oral Daily    fluticasone  1 spray Each Nostril Daily    insulin glargine  7 Units SubCUTAneous Daily       Diet  Current diet/supplement order: ADULT DIET; Regular; 4 carb choices (60 gm/meal)     Recorded PO: PO Meals Eaten (%): 0% last meal in flowsheets      Action     Gricelda Hudson is thinking about eating lunch. Concerned that his blood sugar is over 180. Recommend  BG targets 140 - 180. Physician Notified of event: Yes   Gustavo Kaminski MD'    Recommend adding small prandial dose.       Responce     Medication plan updated: Yes     Electronically signed by Ramy Priest RN on 2023 at 12:29 PM

## 2023-02-27 NOTE — PROGRESS NOTES
1152- Glucose  50- Apple juice given       2052-glucose 104 after two apple juices given. Patient feels better. Will get glucose q4 hrs tonight.

## 2023-02-28 LAB
A/G RATIO: 1.5 (ref 1.1–2.2)
ALBUMIN SERPL-MCNC: 3.3 G/DL (ref 3.4–5)
ALP BLD-CCNC: 57 U/L (ref 40–129)
ALT SERPL-CCNC: 12 U/L (ref 10–40)
ANION GAP SERPL CALCULATED.3IONS-SCNC: 10 MMOL/L (ref 3–16)
AST SERPL-CCNC: 24 U/L (ref 15–37)
BASOPHILS ABSOLUTE: 0 K/UL (ref 0–0.2)
BASOPHILS RELATIVE PERCENT: 0.5 %
BILIRUB SERPL-MCNC: 0.5 MG/DL (ref 0–1)
BUN BLDV-MCNC: 12 MG/DL (ref 7–20)
CALCIUM SERPL-MCNC: 8.4 MG/DL (ref 8.3–10.6)
CHLORIDE BLD-SCNC: 104 MMOL/L (ref 99–110)
CO2: 23 MMOL/L (ref 21–32)
CREAT SERPL-MCNC: 0.8 MG/DL (ref 0.9–1.3)
EOSINOPHILS ABSOLUTE: 0.1 K/UL (ref 0–0.6)
EOSINOPHILS RELATIVE PERCENT: 1.7 %
ESTIMATED AVERAGE GLUCOSE: 134.1 MG/DL
GFR SERPL CREATININE-BSD FRML MDRD: >60 ML/MIN/{1.73_M2}
GLUCOSE BLD-MCNC: 144 MG/DL (ref 70–99)
GLUCOSE BLD-MCNC: 189 MG/DL (ref 70–99)
GLUCOSE BLD-MCNC: 193 MG/DL (ref 70–99)
GLUCOSE BLD-MCNC: 82 MG/DL (ref 70–99)
GLUCOSE BLD-MCNC: 87 MG/DL (ref 70–99)
HBA1C MFR BLD: 6.3 %
HCT VFR BLD CALC: 39.5 % (ref 40.5–52.5)
HEMOGLOBIN: 13.2 G/DL (ref 13.5–17.5)
LYMPHOCYTES ABSOLUTE: 1.3 K/UL (ref 1–5.1)
LYMPHOCYTES RELATIVE PERCENT: 23.1 %
MAGNESIUM: 1.9 MG/DL (ref 1.8–2.4)
MCH RBC QN AUTO: 30.2 PG (ref 26–34)
MCHC RBC AUTO-ENTMCNC: 33.3 G/DL (ref 31–36)
MCV RBC AUTO: 90.8 FL (ref 80–100)
MONOCYTES ABSOLUTE: 0.4 K/UL (ref 0–1.3)
MONOCYTES RELATIVE PERCENT: 7.1 %
NEUTROPHILS ABSOLUTE: 3.8 K/UL (ref 1.7–7.7)
NEUTROPHILS RELATIVE PERCENT: 67.6 %
PDW BLD-RTO: 14.3 % (ref 12.4–15.4)
PERFORMED ON: ABNORMAL
PERFORMED ON: NORMAL
PHOSPHORUS: 3.4 MG/DL (ref 2.5–4.9)
PLATELET # BLD: 233 K/UL (ref 135–450)
PMV BLD AUTO: 9 FL (ref 5–10.5)
POTASSIUM SERPL-SCNC: 4.4 MMOL/L (ref 3.5–5.1)
RBC # BLD: 4.35 M/UL (ref 4.2–5.9)
SODIUM BLD-SCNC: 137 MMOL/L (ref 136–145)
TOTAL CK: 400 U/L (ref 39–308)
TOTAL PROTEIN: 5.5 G/DL (ref 6.4–8.2)
WBC # BLD: 5.6 K/UL (ref 4–11)

## 2023-02-28 PROCEDURE — 97165 OT EVAL LOW COMPLEX 30 MIN: CPT

## 2023-02-28 PROCEDURE — 2580000003 HC RX 258: Performed by: INTERNAL MEDICINE

## 2023-02-28 PROCEDURE — 97161 PT EVAL LOW COMPLEX 20 MIN: CPT

## 2023-02-28 PROCEDURE — 2060000000 HC ICU INTERMEDIATE R&B

## 2023-02-28 PROCEDURE — 6370000000 HC RX 637 (ALT 250 FOR IP): Performed by: INTERNAL MEDICINE

## 2023-02-28 PROCEDURE — 6360000002 HC RX W HCPCS: Performed by: INTERNAL MEDICINE

## 2023-02-28 PROCEDURE — 80053 COMPREHEN METABOLIC PANEL: CPT

## 2023-02-28 PROCEDURE — 84100 ASSAY OF PHOSPHORUS: CPT

## 2023-02-28 PROCEDURE — 97530 THERAPEUTIC ACTIVITIES: CPT

## 2023-02-28 PROCEDURE — 2580000003 HC RX 258: Performed by: STUDENT IN AN ORGANIZED HEALTH CARE EDUCATION/TRAINING PROGRAM

## 2023-02-28 PROCEDURE — 6370000000 HC RX 637 (ALT 250 FOR IP): Performed by: NEUROLOGICAL SURGERY

## 2023-02-28 PROCEDURE — 6370000000 HC RX 637 (ALT 250 FOR IP): Performed by: NURSE PRACTITIONER

## 2023-02-28 PROCEDURE — 6360000002 HC RX W HCPCS: Performed by: STUDENT IN AN ORGANIZED HEALTH CARE EDUCATION/TRAINING PROGRAM

## 2023-02-28 PROCEDURE — 85025 COMPLETE CBC W/AUTO DIFF WBC: CPT

## 2023-02-28 PROCEDURE — 83735 ASSAY OF MAGNESIUM: CPT

## 2023-02-28 PROCEDURE — 94760 N-INVAS EAR/PLS OXIMETRY 1: CPT

## 2023-02-28 PROCEDURE — 82550 ASSAY OF CK (CPK): CPT

## 2023-02-28 PROCEDURE — 83036 HEMOGLOBIN GLYCOSYLATED A1C: CPT

## 2023-02-28 PROCEDURE — 36415 COLL VENOUS BLD VENIPUNCTURE: CPT

## 2023-02-28 RX ORDER — BACLOFEN 10 MG/1
10 TABLET ORAL 2 TIMES DAILY
Status: DISCONTINUED | OUTPATIENT
Start: 2023-02-28 | End: 2023-03-01

## 2023-02-28 RX ORDER — DOCUSATE SODIUM 100 MG/1
100 CAPSULE, LIQUID FILLED ORAL 2 TIMES DAILY PRN
Status: DISCONTINUED | OUTPATIENT
Start: 2023-02-28 | End: 2023-03-02 | Stop reason: HOSPADM

## 2023-02-28 RX ADMIN — GABAPENTIN 100 MG: 100 CAPSULE ORAL at 21:12

## 2023-02-28 RX ADMIN — Medication 10 ML: at 08:38

## 2023-02-28 RX ADMIN — DIAZEPAM 10 MG: 5 TABLET ORAL at 11:39

## 2023-02-28 RX ADMIN — FLUTICASONE PROPIONATE 1 SPRAY: 50 SPRAY, METERED NASAL at 08:41

## 2023-02-28 RX ADMIN — ACETAMINOPHEN 1000 MG: 500 TABLET ORAL at 22:15

## 2023-02-28 RX ADMIN — KETOROLAC TROMETHAMINE 30 MG: 30 INJECTION, SOLUTION INTRAMUSCULAR; INTRAVENOUS at 08:38

## 2023-02-28 RX ADMIN — KETOROLAC TROMETHAMINE 30 MG: 30 INJECTION, SOLUTION INTRAMUSCULAR; INTRAVENOUS at 21:12

## 2023-02-28 RX ADMIN — OXYCODONE HYDROCHLORIDE 10 MG: 10 TABLET ORAL at 12:37

## 2023-02-28 RX ADMIN — TAMSULOSIN HYDROCHLORIDE 0.4 MG: 0.4 CAPSULE ORAL at 08:40

## 2023-02-28 RX ADMIN — METHOCARBAMOL 750 MG: 100 INJECTION INTRAMUSCULAR; INTRAVENOUS at 12:11

## 2023-02-28 RX ADMIN — OXYCODONE HYDROCHLORIDE 10 MG: 10 TABLET ORAL at 08:20

## 2023-02-28 RX ADMIN — GABAPENTIN 100 MG: 100 CAPSULE ORAL at 14:47

## 2023-02-28 RX ADMIN — POTASSIUM CHLORIDE AND SODIUM CHLORIDE: 900; 300 INJECTION, SOLUTION INTRAVENOUS at 02:24

## 2023-02-28 RX ADMIN — DOCUSATE SODIUM 100 MG: 100 CAPSULE, LIQUID FILLED ORAL at 11:39

## 2023-02-28 RX ADMIN — METHOCARBAMOL 750 MG: 100 INJECTION INTRAMUSCULAR; INTRAVENOUS at 18:06

## 2023-02-28 RX ADMIN — KETOROLAC TROMETHAMINE 30 MG: 30 INJECTION, SOLUTION INTRAMUSCULAR; INTRAVENOUS at 14:47

## 2023-02-28 RX ADMIN — OXYCODONE HYDROCHLORIDE 15 MG: 5 TABLET ORAL at 22:15

## 2023-02-28 RX ADMIN — PANTOPRAZOLE SODIUM 40 MG: 40 TABLET, DELAYED RELEASE ORAL at 06:02

## 2023-02-28 RX ADMIN — ENOXAPARIN SODIUM 40 MG: 100 INJECTION SUBCUTANEOUS at 08:41

## 2023-02-28 RX ADMIN — ACETAMINOPHEN 1000 MG: 500 TABLET ORAL at 06:02

## 2023-02-28 RX ADMIN — DIPHENHYDRAMINE HCL 25 MG: 25 TABLET ORAL at 00:00

## 2023-02-28 RX ADMIN — GABAPENTIN 100 MG: 100 CAPSULE ORAL at 08:37

## 2023-02-28 RX ADMIN — INSULIN GLARGINE 7 UNITS: 100 INJECTION, SOLUTION SUBCUTANEOUS at 08:41

## 2023-02-28 RX ADMIN — BACLOFEN 10 MG: 10 TABLET ORAL at 21:12

## 2023-02-28 RX ADMIN — ACETAMINOPHEN 1000 MG: 500 TABLET ORAL at 14:47

## 2023-02-28 RX ADMIN — OXYCODONE HYDROCHLORIDE 10 MG: 10 TABLET ORAL at 18:08

## 2023-02-28 RX ADMIN — BACLOFEN 10 MG: 10 TABLET ORAL at 08:37

## 2023-02-28 RX ADMIN — KETOROLAC TROMETHAMINE 30 MG: 30 INJECTION, SOLUTION INTRAMUSCULAR; INTRAVENOUS at 02:24

## 2023-02-28 RX ADMIN — METHOCARBAMOL 750 MG: 100 INJECTION INTRAMUSCULAR; INTRAVENOUS at 06:02

## 2023-02-28 ASSESSMENT — PAIN DESCRIPTION - ORIENTATION
ORIENTATION: LEFT;RIGHT
ORIENTATION: MID
ORIENTATION: MID

## 2023-02-28 ASSESSMENT — PAIN DESCRIPTION - LOCATION
LOCATION: BACK
LOCATION: BACK
LOCATION: LEG
LOCATION: BACK
LOCATION: GENERALIZED
LOCATION: BACK
LOCATION: BACK

## 2023-02-28 ASSESSMENT — PAIN DESCRIPTION - ONSET: ONSET: ON-GOING

## 2023-02-28 ASSESSMENT — PAIN DESCRIPTION - DESCRIPTORS
DESCRIPTORS: SPASM
DESCRIPTORS: ACHING;DISCOMFORT
DESCRIPTORS: SPASM
DESCRIPTORS: ACHING;DISCOMFORT
DESCRIPTORS: SPASM
DESCRIPTORS: SPASM

## 2023-02-28 ASSESSMENT — PAIN SCALES - GENERAL
PAINLEVEL_OUTOF10: 5
PAINLEVEL_OUTOF10: 3
PAINLEVEL_OUTOF10: 0
PAINLEVEL_OUTOF10: 7
PAINLEVEL_OUTOF10: 7
PAINLEVEL_OUTOF10: 6
PAINLEVEL_OUTOF10: 6
PAINLEVEL_OUTOF10: 7
PAINLEVEL_OUTOF10: 7
PAINLEVEL_OUTOF10: 6
PAINLEVEL_OUTOF10: 7

## 2023-02-28 ASSESSMENT — PAIN DESCRIPTION - FREQUENCY: FREQUENCY: CONTINUOUS

## 2023-02-28 ASSESSMENT — PAIN DESCRIPTION - PAIN TYPE: TYPE: CHRONIC PAIN

## 2023-02-28 NOTE — PROGRESS NOTES
Pt reports his bladder feels full and like he cant empty his bladder. Bladder scan >578 ml. Dr Digna Gordon notified. Electronically signed by Magdalena Salomon RN on 2/28/2023 at 11:28 AM    Smith catheter inserted using sterile technique. Immediate return of clear yellow urine. Pt tolerated well. Pt reports he feels much better.  Electronically signed by Magdalena Salomon RN on 2/28/2023 at 12:24 PM

## 2023-02-28 NOTE — PROGRESS NOTES
[facility] Medicine Progress Note     Date:  2/28/2023    PCP: Raffy Akbar (Tel: [phone])    Date of Admission: 2/25/2023    Chief complaint:   Chief Complaint   Patient presents with    Other     Muscle spasm starting at 1pm today all over       Brief admission history: 59-year-old female with history of type I diabetes type 2, hyperlipidemia, cervical spinal cord injury, who presents to the emergency room with complaints of intractable diffuse back spasm, onset around 1 PM on 2/25/2023. He denies new injury. While in the emergency room, he was tried multiple IV narcotics, including Dilaudid, with significant improvement of symptoms. He was also tried on muscle relaxants with no improvement of symptoms. He received a dose of Valium, with some improvement. Following multiple narcotics, he also required multiple doses of IV Narcan due to respiratory depression. Assessment/plan:  Diffuse muscle spasm of the back. Patient with history of cervical spinal cord injury. CT-cervical spine revealed status post posterior fusion of C1-C2 that is grossly unremarkable, moderate degenerative disc disease throughout the lower cervical spine with no acute abnormality, moderate disc osteophyte complexes at C5-C6 and C6-C7. CT-lumbar spine with chronic L5 superior endplate Schmorl's node, mild central spinal canal narrowing at L3-L4. MRI-cervical spine with syringomyelia. MRI-thoracic and lumbar spine with no significant finding to explain ongoing spasms. Neurosurgery has been consulted to assist with evaluation, await noted cervical syringomyelia. Patient is on multiple medications, including baclofen, Valium, oxycodone, morphine, IV Toradol, Neurontin. He did not have much improvement following a dose of Norflex. Of note, patient did have respiratory depression requiring about 4 doses of IV Narcan in the emergency room. Hence, we have to be careful with multiple sedating medications.   Patient has been evaluated by neurosurgery. Neurosurgery has recommended neurology consult for management of spasms. Neurosurgery has also recommended patient follow-up at [facility] with a physician that does baclofen pump trials. Rhabdomyolysis, mild. Secondary to diffuse spasm. Nearly resolved with intravenous fluid, stopped fluid on 2/28/2023. Continue oral fluid intake. Urinary retention. Likely start on multiple sedatives and narcotics. Patient does have history of BPH. Continue Flomax. Smith catheter inserted on 2/28/2023  History of type 1 diabetes. Hemoglobin A1c pending. Continue scheduled Lantus, sliding scale insulin. Monitor Accu-Chek closely and adjust insulin dose as needed. He has had hypoglycemic episodes due to decreased oral intake. Scheduled Lantus dose has been decreased. Other comorbidities: history of hyperlipidemia, cervical spinal cord injury, underweight with BMI of 19 kg/m². Diet: ADULT DIET; Regular; 4 carb choices (60 gm/meal)    Code status: Full Code   ----------  Subjective  Still with back spasms. Reports bladder distention. Objective  Physical exam:  Vitals: BP (!) 155/84   Pulse 67   Temp 97.6 °F (36.4 °C) (Oral)   Resp 18   Ht 6' (1.829 m)   Wt 139 lb 12.4 oz (63.4 kg)   SpO2 98%   BMI 18.96 kg/m²   Gen/overall appearance: Not in acute distress. Alert. Oriented x3. Head: Normocephalic, atraumatic  Eyes: EOMI, good acuity  ENT: Oral mucosa moist  Neck: No JVD, thyromegaly  CVS: Nml S1S2, no MRG, RRR  Pulm: Clear bilaterally. No crackles/wheezes  Gastrointestinal: Soft, NT/ND, +BS  Musculoskeletal: No edema. Warm  Neuro: No focal deficit. Moves extremity spontaneously. Psychiatry: Appropriate affect. Not agitated. Skin: Warm, dry with normal turgor.  No rash  Capillary refill: Brisk,< 3 seconds   Peripheral Pulses: +2 palpable, equal bilaterally      24HR INTAKE/OUTPUT:    Intake/Output Summary (Last 24 hours) at 2/28/2023 0889  Last data filed at 2/28/2023 8128  Gross per 24 hour   Intake 2634.77 ml   Output 400 ml   Net 2234.77 ml       I/O last 3 completed shifts:   In: 2957.9 [P.O.:980; I.V.:1390.5; IV Piggyback:587.4]  Out: 350 [Urine:350]  I/O this shift:  In: -   Out: 200 [Urine:200]    Meds:    baclofen  10 mg Oral BID    oxyCODONE  15 mg Oral Nightly    gabapentin  100 mg Oral TID    insulin lispro  3 Units SubCUTAneous TID WC    sodium chloride flush  5-40 mL IntraVENous 2 times per day    enoxaparin  40 mg SubCUTAneous Daily    insulin lispro  0-4 Units SubCUTAneous TID WC    insulin lispro  0-4 Units SubCUTAneous Nightly    acetaminophen  1,000 mg Oral 3 times per day    lidocaine  1 patch TransDERmal Daily    ketorolac  30 mg IntraVENous Q6H    pantoprazole  40 mg Oral QAM AC    methocarbamol IVPB  750 mg IntraVENous Q6H    tamsulosin  0.4 mg Oral Daily    fluticasone  1 spray Each Nostril Daily    insulin glargine  7 Units SubCUTAneous Daily     Infusions:    sodium chloride Stopped (02/26/23 1249)    dextrose       PRN Meds: docusate sodium, diphenhydrAMINE, naloxone, sodium chloride flush, sodium chloride, polyethylene glycol, ondansetron, glucose, dextrose bolus **OR** dextrose bolus, glucagon (rDNA), dextrose, morphine, oxyCODONE, diazePAM    Labs/imaging:  CBC:   Recent Labs     02/25/23 2107 02/27/23  0710 02/28/23  0709   WBC 9.7 7.5 5.6   HGB 13.5 13.2* 13.2*    149 233     BMP:    Recent Labs     02/25/23 2107 02/27/23  0710 02/28/23  0709    141 137   K 4.3 3.5 4.4    108 104   CO2 26 22 23   BUN 9 11 12   CREATININE 1.0 0.9 0.8*   GLUCOSE 73 87 82     Hepatic:   Recent Labs     02/25/23 2107 02/27/23  0710 02/28/23  0709   AST 21 36 24   ALT 10 14 12   BILITOT 0.7 0.6 0.5   ALKPHOS 69 47 57       Susana Severino MD  -------------------------------  [facility] hospitalist

## 2023-02-28 NOTE — PROGRESS NOTES
Physical Therapy  Facility/Department: [facility]N PROGRESSIVE CARE  Physical Therapy Initial Assessment    Name: Jamey Ho  : 1970  MRN: [id number]  Date of Service: 2023    Discharge Recommendations:  Patient would benefit from continued therapy after discharge, Home with assist PRN   PT Equipment Recommendations  Equipment Needed: No      Patient Diagnosis(es): The primary encounter diagnosis was Intractable back pain. Diagnoses of Spasm of muscle and Closed fracture of cervical vertebra, subsequent encounter were also pertinent to this visit. Past Medical History:  has a past medical history of Cervical spinal cord injury ([facility] [address] Diabetes mellitus ([facility] [address] and Hyperlipidemia. Past Surgical History:  has a past surgical history that includes Anterior cruciate ligament repair (2011); Clavicle surgery (); and hip surgery (). Assessment   Body Structures, Functions, Activity Limitations Requiring Skilled Therapeutic Intervention: Decreased functional mobility ; Decreased balance;Decreased posture  Assessment: Pt is a 46 y.o. M. admitted  for severe muscle spasms in his low back. PMH includes incomplete SCI in , but pt was able to return home with his wife, and has been ambulatory up to this point. He presents with functional LE strength, no muscle spasms during evaluation. Pt reports diffuse numbness in UEs/LEs, but was able to ambulate 50' in room with SBA. He would benefit from continued therapy to maximize his strength, balance, endurance. Anticipate safe return home with assist from wife. Jamey Ho scored a 20/24 on the AM-PAC short mobility form. If patient discharges prior to next session this note will serve as a discharge summary. Please see below for the latest assessment towards goals.       Treatment Diagnosis: Decreased balance; Decreased endurance  Specific Instructions for Next Treatment: Improve balance, strength, endurance, independence ambulating  Therapy Prognosis: Good  Decision Making: Low Complexity  History: See below  Exam: Strength; ROM; Balance; Ambulation  Clinical Presentation: Stable  Barriers to Learning: None  Requires PT Follow-Up: Yes  Activity Tolerance  Activity Tolerance: Patient tolerated evaluation without incident     Plan   Physcial Therapy Plan  General Plan: 2-3 times per week  Specific Instructions for Next Treatment: Improve balance, strength, endurance, independence ambulating  Current Treatment Recommendations: Balance training, Gait training, Stair training, Functional mobility training, Strengthening, Return to work related activity, Home exercise program, Transfer training, Neuromuscular re-education, Safety education & training, Patient/Caregiver education & training, ADL/Self-care training, Equipment evaluation, education, & procurement, Endurance training, Therapeutic activities  Safety Devices  Type of Devices: All fall risk precautions in place, Call light within reach, Left in bed, Nurse notified  Restraints  Restraints Initially in Place: No     Restrictions  Restrictions/Precautions  Restrictions/Precautions: Fall Risk  Position Activity Restriction  Other position/activity restrictions: Hx of SCI     Subjective   General  Chart Reviewed: Yes  Patient assessed for rehabilitation services?: Yes  Additional Pertinent Hx: 55-year-old female with history of type I diabetes type 2, hyperlipidemia, cervical spinal cord injury, who presents to the emergency room with complaints of intractable diffuse back spasm, onset around 1 PM on 2/25/2023. He denies new injury. While in the emergency room, he was tried multiple IV narcotics, including Dilaudid, with significant improvement of symptoms. He was also tried on muscle relaxants with no improvement of symptoms. He received a dose of Valium, with some improvement.   Following multiple narcotics, he also required multiple doses of IV Narcan due to respiratory depression. Response To Previous Treatment: Not applicable  Referring Practitioner: Eboni Lyle MD  Referral Date : 02/28/23  Diagnosis: Diffuse muscle spasm of back; Rhabdomyelosis; Type 1 DM  Follows Commands: Within Functional Limits  Subjective  Subjective: Pt agreeable to activity. Social/Functional History  Social/Functional History  Lives With: Spouse  Type of Home: House  Home Layout: One level, Work area in basement  Home Access: Stairs to enter with rails  Entrance Stairs - Number of Steps: 3 GE with HR; Full flight to basement with (B) HR  Bathroom Shower/Tub: Walk-in shower  Bathroom Toilet: Handicap height  Bathroom Equipment: Built-in shower seat  Home Equipment:  (No DME)  ADL Assistance: Independent  Homemaking Assistance: Independent  Ambulation Assistance: Independent  Transfer Assistance: Independent  Active : Yes  Mode of Transportation: Car    Vision/Hearing  Vision  Vision: Within Functional Limits  Hearing  Hearing: Within functional limits      Cognition   Orientation  Overall Orientation Status: Within Normal Limits     Objective     AROM RLE (degrees)  RLE AROM: WFL  AROM LLE (degrees)  LLE AROM : WFL    Strength RLE  Strength RLE: WFL  Strength LLE  Strength LLE: WFL    Bed mobility  Supine to Sit: Independent  Sit to Supine: Independent    Transfers  Sit to Stand: Supervision  Stand to Sit: Supervision    Ambulation  Surface: Level tile  Device: No Device  Assistance: Stand by assistance  Quality of Gait: Slow speed, step-through pattern, mild (B) knee flexion noted, no muscle spasm during ambulation, no LOB.   Distance: 25', 48'    AM-PAC Score  AM-PAC Inpatient Mobility Raw Score : 20 (02/28/23 1401)  AM-PAC Inpatient T-Scale Score : 47.67 (02/28/23 1401)  Mobility Inpatient CMS 0-100% Score: 35.83 (02/28/23 1401)  Mobility Inpatient CMS G-Code Modifier : CJ (02/28/23 1401)    Goals  Short Term Goals  Time Frame for Short Term Goals: 2-3 days  Short Term Goal 1: Transfers (I)  Short Term Goal 2: Ambulation 250' (I)  Short Term Goal 3: Ascend/Descend 12 steps (I)  Patient Goals   Patient Goals : To return home     Education  Patient Education  Education Given To: Patient  Education Provided: Role of Therapy;Plan of Care; Fall Prevention Strategies  Education Method: Demonstration;Verbal  Education Outcome: Continued education needed    Therapy Time   Individual Concurrent Group Co-treatment   Time In       9505   Time Out       7439   Minutes       30   Timed Code Treatment Minutes: 15 Minutes   Low Complexity Evaluation    Tommie Rush PT   Electronically signed by Tommie Rush PT [id number] on 2/28/2023 at 2:16 PM

## 2023-02-28 NOTE — PROGRESS NOTES
Occupational Therapy  Facility/Department: [address] PROGRESSIVE CARE  Occupational Therapy Initial Assessment and Tentative D/C      Name: Minnie Levy  : 1970  MRN: [id number]  Date of Service: 2023    Discharge Recommendations: Minnie Levy scored a 19/24 on the AM-PAC ADL Inpatient form. Current research shows that an AM-PAC score of 18 or greater is typically associated with a discharge to the patient's home setting. If patient discharges prior to next session this note will serve as a discharge summary. Please see below for the latest assessment towards goals. Continue to assess pending progress, Home with assist PRN  OT Equipment Recommendations  Equipment Needed: No       Patient Diagnosis(es): The primary encounter diagnosis was Intractable back pain. Diagnoses of Spasm of muscle and Closed fracture of cervical vertebra, subsequent encounter were also pertinent to this visit. Past Medical History:  has a past medical history of Cervical spinal cord injury ([facility] [address] Diabetes mellitus ([facility] [address] and Hyperlipidemia. Past Surgical History:  has a past surgical history that includes Anterior cruciate ligament repair (2011); Clavicle surgery (); and hip surgery (). Assessment   Performance deficits / Impairments: Decreased safe awareness;Decreased functional mobility ; Decreased strength;Decreased high-level IADLs;Decreased endurance;Decreased ADL status; Decreased balance  Assessment: Minnie Levy is a 46 y.o. male who presents to the emergency department today with his wife with complaints of a muscle spasm. Patient reports that his symptoms started today around 1 PM.  He states muscle spasms are all over, and debilitating. He states that he does have a history of a cervical spine injury and historically has been treating his muscle spasms with baclofen, oxycodone. He has been out of his oxycodone for the last 3 to 4 days.   His baclofen is not helping to improve his symptoms. He denies any recent reinjury or fall. He does follow regularly with his pain management providers, and is working to get a order for another imaging study to further evaluate his spine on an outpatient basis. He presents today because of the severity of pain, and a few weeks ago this happened and he was treated with morphine and Valium which did help to significantly improve his symptoms. He has no further complaints. PTA pt from home where pt was Ind with mobility and ADLs. Pt currently requires SBA/supervision for functional mobility and transfers. Pt with no LOB. Anticipate pt needing up to SBA/Min A for ADLs. Pt will benefit from skilled OT services at this time. Anticipate pt safe to return home at time of D/C. Prognosis: Good;Fair  Decision Making: Low Complexity  Exam: see above  REQUIRES OT FOLLOW-UP: Yes  Activity Tolerance  Activity Tolerance: Patient Tolerated treatment well        Plan   Occupational Therapy Plan  Times Per Week: 3-5x  Current Treatment Recommendations: Strengthening, Balance training, Functional mobility training, Endurance training, Self-Care / ADL, Patient/Caregiver education & training, Safety education & training     Restrictions  Restrictions/Precautions  Restrictions/Precautions: Fall Risk  Position Activity Restriction  Other position/activity restrictions: Hx of SCI    Subjective   General  Chart Reviewed: Yes  Patient assessed for rehabilitation services?: Yes  Additional Pertinent Hx: per ED note, Eran Medeiros is a 46 y.o. male who presents to the emergency department today with his wife with complaints of a muscle spasm. Patient reports that his symptoms started today around 1 PM.  He states muscle spasms are all over, and debilitating. He states that he does have a history of a cervical spine injury and historically has been treating his muscle spasms with baclofen, oxycodone. He has been out of his oxycodone for the last 3 to 4 days. His baclofen is not helping to improve his symptoms. He denies any recent reinjury or fall. He does follow regularly with his pain management providers, and is working to get a order for another imaging study to further evaluate his spine on an outpatient basis. He presents today because of the severity of pain, and a few weeks ago this happened and he was treated with morphine and Valium which did help to significantly improve his symptoms. He has no further complaints  Family / Caregiver Present: No  Referring Practitioner: Arbie Closs, MD  Subjective  Subjective: Pt agreeable to OT evaluation. Pt reports no spasms during session. Social/Functional History  Social/Functional History  Lives With: Spouse  Type of Home: House  Home Layout: One level, Work area in basement  Home Access: Stairs to enter with rails  Entrance Stairs - Number of Steps: 3 GE with HR; Full flight to basement with (B) HR  Bathroom Shower/Tub: Walk-in shower  Bathroom Toilet: Handicap height  Bathroom Equipment: Built-in shower seat  Home Equipment:  (No DME)  ADL Assistance: Independent  Homemaking Assistance: Independent  Ambulation Assistance: Independent  Transfer Assistance: Independent  Active : Yes  Mode of Transportation: Car       Objective   Heart Rate: 75  Heart Rate Source: Monitor  BP: (!) 153/88  BP Location: Left Arm  BP Method: Automatic  Patient Position: Semi fowlers  MAP (Calculated): 110  Resp: 18  SpO2: 98 %  O2 Device: None (Room air)             Safety Devices  Type of Devices: All fall risk precautions in place;Call light within reach; Left in bed;Nurse notified  Restraints  Restraints Initially in Place: No  Bed Mobility Training  Bed Mobility Training: Yes  Overall Level of Assistance: Independent  Supine to Sit: Independent  Sit to Supine: Independent  Scooting: Independent  Balance  Sitting: Intact  Standing: Impaired  Standing - Static: Good  Standing - Dynamic: Fair  Transfer Training  Transfer Training: Yes  Overall Level of Assistance: Supervision  Sit to Stand: Supervision  Stand to Sit: Supervision  Toilet Transfer: Supervision  Gait  Overall Level of Assistance: Stand-by assistance (no LOB)  Distance (ft): 25 Feet (25ft, 50ft)     AROM: Generally decreased, functional (limited R shoulder flexion)  PROM: Within functional limits  Strength:  Within functional limits  Coordination: Within functional limits  Tone: Normal  Sensation: Impaired (pt reports baseline sensation deficits throughout)  ADL  Toileting: Dependent/Total (gracia)  Additional Comments: Anticipate pt needing up to SBA/Min A for ADLs based on ROM, strength, and balance     Activity Tolerance  Activity Tolerance: Patient tolerated evaluation without incident        Vision  Vision: Within Functional Limits  Hearing  Hearing: Within functional limits  Cognition  Overall Cognitive Status: WFL  Orientation  Overall Orientation Status: Within Normal Limits                  Education Given To: Patient  Education Provided: Role of Therapy;Plan of Care;Transfer Training  Education Method: Demonstration;Verbal  Barriers to Learning: None  Education Outcome: Verbalized understanding;Demonstrated understanding          AM-PAC Score        AM-[facility] Inpatient Daily Activity Raw Score: 19 (02/28/23 1419)  AM-PAC Inpatient ADL T-Scale Score : 40.22 (02/28/23 1419)  ADL Inpatient CMS 0-100% Score: 42.8 (02/28/23 1419)  ADL Inpatient CMS G-Code Modifier : CK (02/28/23 1419)    Tinneti Score       Goals  Short Term Goals  Time Frame for Short Term Goals: prior to D/C  Short Term Goal 1: complete functional mobility and trasnfers Ind  Short Term Goal 2: complete bathing and dressing Ind  Short Term Goal 3: complete toileting Ind  Short Term Goal 4: complete grooming in stance at sink Ind  Long Term Goals  Time Frame for Long Term Goals : STG=LTG  Patient Goals   Patient goals : return home       Therapy Time   Individual Concurrent Group Co-treatment   Time In 1335         Time Out 1405         Minutes 30         Timed Code Treatment Minutes: 15 Minutes (15 minute eval)       Sheng Service, OTR/L

## 2023-02-28 NOTE — PROGRESS NOTES
Patient resting in bed, alert and oriented. Large BM this shift. Patient c/o pain 7/10, PRN and scheduled pain medications given with little relief. Patient requested sleep aide, order for PRN benadryl received and administered. Patient safe with call light in reach and calls appropriately.

## 2023-02-28 NOTE — PLAN OF CARE
Problem: ABCDS Injury Assessment  Goal: Absence of physical injury  Outcome: Progressing     Problem: Discharge Planning  Goal: Discharge to home or other facility with appropriate resources  Outcome: Progressing         Problem: Pain  Goal: Verbalizes/displays adequate comfort level or baseline comfort level  Outcome: Progressing         Problem: Neurosensory - Adult  Goal: Achieves stable or improved neurological status  Outcome: Progressing  Flowsheets (Taken 2/27/2023 2118)  Achieves stable or improved neurological status: Assess for and report changes in neurological status     Problem: Skin/Tissue Integrity - Adult  Goal: Skin integrity remains intact  Outcome: Progressing         Problem: Musculoskeletal - Adult  Goal: Return mobility to safest level of function  Outcome: Progressing  Flowsheets (Taken 2/27/2023 2118)  Return Mobility to Safest Level of Function: Assess patient stability and activity tolerance for standing, transferring and ambulating with or without assistive devices     Problem: Gastrointestinal - Adult  Goal: Maintains or returns to baseline bowel function  Outcome: Progressing      Problem: Metabolic/Fluid and Electrolytes - Adult  Goal: Electrolytes maintained within normal limits  Outcome: Progressing  Flowsheets (Taken 2/27/2023 2118)  Electrolytes maintained within normal limits: Monitor labs and assess patient for signs and symptoms of electrolyte imbalances     Problem: Anxiety  Goal: Will report anxiety at manageable levels  Description: INTERVENTIONS:  1. Administer medication as ordered  2. Teach and rehearse alternative coping skills  3. Provide emotional support with 1:1 interaction with staff  Outcome: Progressing  Flowsheets (Taken 2/27/2023 2118)  Will report anxiety at manageable levels: Administer medication as ordered     Problem: Coping  Goal: Pt/Family able to verbalize concerns and demonstrate effective coping strategies  Description: INTERVENTIONS:  1.  Assist patient/family to identify coping skills, available support systems and cultural and spiritual values  2. Provide emotional support, including active listening and acknowledgement of concerns of patient and caregivers  3. Reduce environmental stimuli, as able  4. Instruct patient/family in relaxation techniques, as appropriate  5. Assess for spiritual pain/suffering and initiate Spiritual Care, Psychosocial Clinical Specialist consults as needed  Outcome: Progressing  Flowsheets (Taken 2/27/2023 2118)  Patient/family able to verbalize anxieties, fears, and concerns, and demonstrate effective coping: Assist patient/family to identify coping skills, available support systems and cultural and spiritual values     Problem: Behavior  Goal: Pt/Family maintain appropriate behavior and adhere to behavioral management agreement, if implemented  Description: INTERVENTIONS:  1. Assess patient/family's coping skills and  non-compliant behavior (including use of illegal substances)  2. Notify security of behavior or suspected illegal substances which indicate the need for search of the family and/or belongings  3. Encourage verbalization of thoughts and concerns in a socially appropriate manner  4. Utilize positive, consistent limit setting strategies supporting safety of patient, staff and others  5. Encourage participation in the decision making process about the behavioral management agreement  6. If a visitor's behavior poses a threat to safety call refer to organization policy.  7. Initiate consult with , Psychosocial CNS, Spiritual Care as appropriate  Outcome: Progressing  Flowsheets (Taken 2/27/2023 2118)  Patient/family maintains appropriate behavior and adheres to behavioral management agreement, if implemented: Assess patient/family’s coping skills and  non-compliant behavior (including use of illegal substances)

## 2023-02-28 NOTE — CONSULTS
Neurology Consult Note  Reason for Consult: prior spinal cord injury, spasticity and painful severe muscle spasm    Chief complaint: spasms, muscle tightness    Dr Eboni Lyle MD asked me to see Jamey Ho in consultation for evaluation of prior spinal cord injury, spasticity and painful severe muscle spasm    History of Present Illness:  Jamey Ho is a 46 y.o. male who presents with muscle spasms. I obtained my information via interview w/ the patient, supplemented by chart review. The patient suffered a cervical spine injury in 2021 requiring C1-C2 fusion and decompression. He says over time he has gradually been making progress. He got to the point where he could ambulate w/out any assistive device, though at times he will drag his RLE. For the past year he has been taking bacofen TID for muscle spasms and spasticity. Over the past week he basically says that his spasms have been so bad that he was taking at least 12 baclofen tablets per day. He says it feel like his muscles are constantly under tension like he is lifting weights. He decided to come to the ED in order to be evaluated. He says that he can feel the spasms coming on just prior to his next available dose of medication. He saw neurosurgery (Dr. Philippe Juarez) recently outpatient.       Medical History:  Past Medical History:   Diagnosis Date    Cervical spinal cord injury ([facility]     Diabetes mellitus ([facility]     Hyperlipidemia      Past Surgical History:   Procedure Laterality Date    ANTERIOR CRUCIATE LIGAMENT REPAIR  7/28/2011    right    CLAVICLE SURGERY  2006    HIP SURGERY  1989    pins     Scheduled Meds:   baclofen  10 mg Oral BID    oxyCODONE  15 mg Oral Nightly    gabapentin  100 mg Oral TID    insulin lispro  3 Units SubCUTAneous TID     sodium chloride flush  5-40 mL IntraVENous 2 times per day    enoxaparin  40 mg SubCUTAneous Daily    insulin lispro  0-4 Units SubCUTAneous TID WC insulin lispro  0-4 Units SubCUTAneous Nightly    acetaminophen  1,000 mg Oral 3 times per day    lidocaine  1 patch TransDERmal Daily    ketorolac  30 mg IntraVENous Q6H    pantoprazole  40 mg Oral QAM AC    methocarbamol IVPB  750 mg IntraVENous Q6H    tamsulosin  0.4 mg Oral Daily    fluticasone  1 spray Each Nostril Daily    insulin glargine  7 Units SubCUTAneous Daily     Continuous Infusions:   0.9% NaCl with KCl 40 mEq 100 mL/hr at 23 0636     Medications Prior to Admission:   Insulin Degludec (TRESIBA) 100 UNIT/ML SOLN, Inject 13 Units into the skin every morning  atorvastatin (LIPITOR) 20 MG tablet, Take 20 mg by mouth daily  baclofen (LIORESAL) 10 MG tablet, Take 10 mg by mouth 4 times daily as needed  oxyCODONE (ROXICODONE) 5 MG immediate release tablet, Take 15 mg by mouth at bedtime. insulin aspart (NOVOLOG) 100 UNIT/ML injection pen, Inject 5-10 Units into the skin 4 times daily (after meals and at bedtime)  tamsulosin (FLOMAX) 0.4 MG capsule, Take 0.4 mg by mouth daily  insulin glargine (LANTUS) 100 UNIT/ML injection vial, Inject 13 Units into the skin daily  valsartan (DIOVAN) 40 MG tablet, Take 40 mg by mouth daily  Continuous Blood Gluc Sensor (DEXCOM G6 SENSOR) MISC, USE 1 EVERY 10 DAYS  Continuous Blood Gluc Transmit (DEXCOM G6 TRANSMITTER) MISC, USE 1 EVERY 90 DAYS  LANTUS 100 UNIT/ML injection,   HUMALOG 100 UNIT/ML injection,   SIMVASTATIN PO, Take 20 mg by mouth daily    No Known Allergies    Family History   Problem Relation Age of Onset    Diabetes Paternal Grandmother      Social History     Tobacco Use   Smoking Status Former    Types: Cigarettes    Quit date: 2000    Years since quittin.1   Smokeless Tobacco Never     Social History     Substance and Sexual Activity   Drug Use No     Social History     Substance and Sexual Activity   Alcohol Use Yes     ROS  Constitutional- No weight loss or fevers  Eyes- No diplopia. No photophobia. Ears/nose/throat- No dysphagia.  No Dysarthria  Cardiovascular- No palpitations. No chest pain  Respiratory- No dyspnea. No Cough  Gastrointestinal- No Abdominal pain. No Vomiting. Genitourinary- No incontinence. No urinary retention  Musculoskeletal- No myalgia. No arthralgia  Skin- No rash. No easy bruising. Psychiatric- No depression. No anxiety  Endocrine- No diabetes. No thyroid issues. Hematologic- No bleeding difficulty. No fatigue  Neurologic- No weakness. No Headache. Exam  Blood pressure (!) 155/84, pulse 67, temperature 97.6 °F (36.4 °C), temperature source Oral, resp. rate 18, height 6' (1.829 m), weight 139 lb 12.4 oz (63.4 kg), SpO2 98 %. Constitutional    Vital signs: BP, HR, and RR reviewed   General alert, no distress  Eyes: unable to visualize the fundi  Cardiovascular: no peripheral edema. Psychiatric: cooperative with examination, no psychotic behavior noted. Neurologic  Mental status:   orientation to person, place, time. General fund of knowledge grossly intact   Memory grossly intact   Attention intact as able to attend well to the exam     Language fluent in conversation   Comprehension intact; follows simple commands  Cranial nerves:   CN2: visual fields full   CN 3,4,6: extraocular muscles intact. CN5: facial sensation symmetric   CN7: face symmetric without dysarthria  CN8: hearing grossly intact  CN9: palate elevated symmetrically  CN11: trap full strength on shoulder shrug  CN12: tongue midline with protrusion  Strength: 4/5 BUE/BLE. Deep tendon reflexes: hyper reflexic BUE, normal BLE. Sensory: reports a burning sensation over body, some numbness in distal BUE. Cerebellar/coordination: FNF abnormal.    Tone: spastic all 4 limbs. Gait: deferred at this time for safety.      Labs  Glucose 87  Na 137  K 4.4  BUN 12  Cr 0.8  Mg 1.90    ALT 12   AST 24    CK 1221    WBC 5.6K  Hg 13.2  Platelets 286    UA negative    Studies  MRI cervical spine w/o 2/26/23, independently reviewed  Impression   Focal syrinx/myelomalacia at C2. Moderate spondylosis without severe canal stenosis or spinal cord compression. MRI thoracic spine w/o 2/26/23, independently reviewed  Impression   Evidence of mild to moderate multilevel degenerative disc disease in the   midportion and lower portion of thoracic spine. There is no significant   posterior disc bulge. No evidence of disc protrusion or herniation at any   level in the thoracic spine. No evidence of thoracic central canal stenosis   or neural foraminal stenosis at any level in the thoracic spine. Thoracic spinal cord is of normal signal.  No evidence of thoracic spinal   cord compression. No evidence of fracture, bone contusion or marrow replacing lesion in   thoracic spine. MRI lumbar spine w/o 2/26/23, independently reviewed  Impression   1. Multilevel degenerative change with mild to moderate spinal canal stenosis   at L4-5.   2. Multilevel neural foraminal narrowing as above. 3. Anterolisthesis at L5-S1. Impression/Recommendations  Muscle spasms. Weakness/spasticity. Previous cervical spine injury. Degenerative disc disease. Elevated CK. The patient has chronic spasms/spasticity secondary to his cervical spine injury. His spasms got particularly worse over the past week using copious amounts of baclofen. He should be continued on baclofen in order to avoid any withdrawal symptoms, though certainly should not be overmedicating. Neurosurgery noted reviewed.       Will discuss plan of care w/ attending neurologist.      Archie Landeros NP  [address] [place] [address] [phone] Neurology    A copy of this note was provided for Dr Jaquan Lopez MD

## 2023-02-28 NOTE — CARE COORDINATION
Patient continues with back pain. Patient is having urinary retention, new gracia today. Neurosurgery following. PT/OT eval recs home with assist PRN. Discharge plan is for patient to return home with spouse. Will continue to follow for discharge needs.     Electronically signed by Delmi De Jesus RN Case Management [phone] on 2/28/2023 at 3:48 PM

## 2023-02-28 NOTE — PROGRESS NOTES
Patient agitated this AM. Patient states spasms are getting worse. Scheduled Robaxin infusing per order. This nurse assisted patient to put on home t-shirt this morning per patient request. Nurse educated patient that it may be helpful to have gown on in relation to IV access and telemetry monitoring, patient refused. Patient safe with call light in reach and calls appropriately.

## 2023-02-28 NOTE — CONSULTS
Patient seen and examined  See fully dicated consult note. Patient had C2 spinal cord injury and C1-2 fusion at [facility]. Patient reported immediately after fall had paralysis, had improvement in lower extremities and later upper extremities. Upper extremities remained more spastic with allodynia and this is a story consistent with central cord syndrome. Patient presented to my office last Friday with complaints of worsening pain and spasticity all over I don't know who is managing his pain medication or baclofen. Patient overused baclofen due to pain and spasm and developed mental status changes. Presented to ER  and admitted. He stated he completely stopped his baclofen. MRI C T L reviewed by me. There is post surgical changes of a posterior C1-2 stabilization, and a 6mm area of central myelomalacia at C2. There is no significant spinal cord compression in the cervical spine or thoracic spine. Lumbar spine shows no stenosis. There is no indication for neurosurgical decompression. Patient to be seen by neurology to evaluate alternative causes for his worsening spasticity and manage his dose of baclofen or other medications. I think the patient should get trialed for a bacolfen pump. I think there is a physcian at [facility] that does baclofen pump trials.     Patient should go to [facility] after medically stable

## 2023-03-01 LAB
A/G RATIO: 1.5 (ref 1.1–2.2)
ALBUMIN SERPL-MCNC: 3.4 G/DL (ref 3.4–5)
ALBUMIN SERPL-MCNC: 3.5 G/DL (ref 3.4–5)
ALP BLD-CCNC: 63 U/L (ref 40–129)
ALT SERPL-CCNC: 11 U/L (ref 10–40)
ANION GAP SERPL CALCULATED.3IONS-SCNC: 15 MMOL/L (ref 3–16)
ANION GAP SERPL CALCULATED.3IONS-SCNC: 15 MMOL/L (ref 3–16)
AST SERPL-CCNC: 16 U/L (ref 15–37)
BACTERIA: ABNORMAL /HPF
BASE EXCESS ARTERIAL: -3.6 MMOL/L (ref -3–3)
BASOPHILS ABSOLUTE: 0 K/UL (ref 0–0.2)
BASOPHILS RELATIVE PERCENT: 0.7 %
BILIRUB SERPL-MCNC: 0.6 MG/DL (ref 0–1)
BILIRUBIN URINE: NEGATIVE
BLOOD, URINE: ABNORMAL
BUN BLDV-MCNC: 14 MG/DL (ref 7–20)
BUN BLDV-MCNC: 15 MG/DL (ref 7–20)
CALCIUM SERPL-MCNC: 8.7 MG/DL (ref 8.3–10.6)
CALCIUM SERPL-MCNC: 8.8 MG/DL (ref 8.3–10.6)
CARBOXYHEMOGLOBIN ARTERIAL: 1.1 % (ref 0–1.5)
CHLORIDE BLD-SCNC: 96 MMOL/L (ref 99–110)
CHLORIDE BLD-SCNC: 98 MMOL/L (ref 99–110)
CLARITY: CLEAR
CO2: 20 MMOL/L (ref 21–32)
CO2: 21 MMOL/L (ref 21–32)
COLOR: YELLOW
CREAT SERPL-MCNC: 0.8 MG/DL (ref 0.9–1.3)
CREAT SERPL-MCNC: 0.8 MG/DL (ref 0.9–1.3)
EOSINOPHILS ABSOLUTE: 0.1 K/UL (ref 0–0.6)
EOSINOPHILS RELATIVE PERCENT: 1.6 %
EPITHELIAL CELLS, UA: 0 /HPF (ref 0–5)
GFR SERPL CREATININE-BSD FRML MDRD: >60 ML/MIN/{1.73_M2}
GFR SERPL CREATININE-BSD FRML MDRD: >60 ML/MIN/{1.73_M2}
GLUCOSE BLD-MCNC: 224 MG/DL (ref 70–99)
GLUCOSE BLD-MCNC: 226 MG/DL (ref 70–99)
GLUCOSE BLD-MCNC: 247 MG/DL (ref 70–99)
GLUCOSE BLD-MCNC: 254 MG/DL (ref 70–99)
GLUCOSE BLD-MCNC: 257 MG/DL (ref 70–99)
GLUCOSE BLD-MCNC: 322 MG/DL (ref 70–99)
GLUCOSE URINE: >=1000 MG/DL
HCO3 ARTERIAL: 21 MMOL/L (ref 21–29)
HCT VFR BLD CALC: 41.3 % (ref 40.5–52.5)
HEMOGLOBIN, ART, EXTENDED: 13.6 G/DL (ref 13.5–17.5)
HEMOGLOBIN: 13.7 G/DL (ref 13.5–17.5)
HYALINE CASTS: 1 /LPF (ref 0–8)
KETONES, URINE: >=160 MG/DL
LEUKOCYTE ESTERASE, URINE: NEGATIVE
LYMPHOCYTES ABSOLUTE: 1 K/UL (ref 1–5.1)
LYMPHOCYTES RELATIVE PERCENT: 15.5 %
MAGNESIUM: 1.9 MG/DL (ref 1.8–2.4)
MCH RBC QN AUTO: 29.9 PG (ref 26–34)
MCHC RBC AUTO-ENTMCNC: 33.2 G/DL (ref 31–36)
MCV RBC AUTO: 90.1 FL (ref 80–100)
METHEMOGLOBIN ARTERIAL: 0.7 %
MICROSCOPIC EXAMINATION: YES
MONOCYTES ABSOLUTE: 0.3 K/UL (ref 0–1.3)
MONOCYTES RELATIVE PERCENT: 4.6 %
NEUTROPHILS ABSOLUTE: 5.2 K/UL (ref 1.7–7.7)
NEUTROPHILS RELATIVE PERCENT: 77.6 %
NITRITE, URINE: NEGATIVE
O2 SAT, ARTERIAL: 96 %
O2 THERAPY: ABNORMAL
PCO2 ARTERIAL: 35.8 MMHG (ref 35–45)
PDW BLD-RTO: 14.5 % (ref 12.4–15.4)
PERFORMED ON: ABNORMAL
PH ARTERIAL: 7.38 (ref 7.35–7.45)
PH UA: 5.5 (ref 5–8)
PHOSPHORUS: 2.9 MG/DL (ref 2.5–4.9)
PHOSPHORUS: 2.9 MG/DL (ref 2.5–4.9)
PLATELET # BLD: 238 K/UL (ref 135–450)
PMV BLD AUTO: 9.3 FL (ref 5–10.5)
PO2 ARTERIAL: 78.1 MMHG (ref 75–108)
POTASSIUM SERPL-SCNC: 4.5 MMOL/L (ref 3.5–5.1)
POTASSIUM SERPL-SCNC: 4.5 MMOL/L (ref 3.5–5.1)
PROTEIN UA: NEGATIVE MG/DL
RBC # BLD: 4.58 M/UL (ref 4.2–5.9)
RBC UA: 27 /HPF (ref 0–4)
SODIUM BLD-SCNC: 131 MMOL/L (ref 136–145)
SODIUM BLD-SCNC: 134 MMOL/L (ref 136–145)
SPECIFIC GRAVITY UA: 1.03 (ref 1–1.03)
TCO2 ARTERIAL: 22.1 MMOL/L
TOTAL CK: 193 U/L (ref 39–308)
TOTAL PROTEIN: 5.9 G/DL (ref 6.4–8.2)
URINE TYPE: ABNORMAL
UROBILINOGEN, URINE: 0.2 E.U./DL
WBC # BLD: 6.7 K/UL (ref 4–11)
WBC UA: 4 /HPF (ref 0–5)

## 2023-03-01 PROCEDURE — 6370000000 HC RX 637 (ALT 250 FOR IP): Performed by: NEUROLOGICAL SURGERY

## 2023-03-01 PROCEDURE — 82803 BLOOD GASES ANY COMBINATION: CPT

## 2023-03-01 PROCEDURE — 36415 COLL VENOUS BLD VENIPUNCTURE: CPT

## 2023-03-01 PROCEDURE — 84100 ASSAY OF PHOSPHORUS: CPT

## 2023-03-01 PROCEDURE — 2060000000 HC ICU INTERMEDIATE R&B

## 2023-03-01 PROCEDURE — 6370000000 HC RX 637 (ALT 250 FOR IP): Performed by: INTERNAL MEDICINE

## 2023-03-01 PROCEDURE — 82550 ASSAY OF CK (CPK): CPT

## 2023-03-01 PROCEDURE — 6360000002 HC RX W HCPCS: Performed by: INTERNAL MEDICINE

## 2023-03-01 PROCEDURE — 80053 COMPREHEN METABOLIC PANEL: CPT

## 2023-03-01 PROCEDURE — 6360000002 HC RX W HCPCS: Performed by: STUDENT IN AN ORGANIZED HEALTH CARE EDUCATION/TRAINING PROGRAM

## 2023-03-01 PROCEDURE — 2580000003 HC RX 258: Performed by: INTERNAL MEDICINE

## 2023-03-01 PROCEDURE — 2580000003 HC RX 258: Performed by: STUDENT IN AN ORGANIZED HEALTH CARE EDUCATION/TRAINING PROGRAM

## 2023-03-01 PROCEDURE — 83735 ASSAY OF MAGNESIUM: CPT

## 2023-03-01 PROCEDURE — 81001 URINALYSIS AUTO W/SCOPE: CPT

## 2023-03-01 PROCEDURE — 36600 WITHDRAWAL OF ARTERIAL BLOOD: CPT

## 2023-03-01 PROCEDURE — 6370000000 HC RX 637 (ALT 250 FOR IP): Performed by: STUDENT IN AN ORGANIZED HEALTH CARE EDUCATION/TRAINING PROGRAM

## 2023-03-01 PROCEDURE — 85025 COMPLETE CBC W/AUTO DIFF WBC: CPT

## 2023-03-01 PROCEDURE — 94760 N-INVAS EAR/PLS OXIMETRY 1: CPT

## 2023-03-01 RX ORDER — ONDANSETRON 4 MG/1
4 TABLET, FILM COATED ORAL EVERY 8 HOURS PRN
Status: DISCONTINUED | OUTPATIENT
Start: 2023-03-01 | End: 2023-03-02 | Stop reason: HOSPADM

## 2023-03-01 RX ORDER — BACLOFEN 10 MG/1
20 TABLET ORAL 2 TIMES DAILY
Status: DISCONTINUED | OUTPATIENT
Start: 2023-03-01 | End: 2023-03-02 | Stop reason: HOSPADM

## 2023-03-01 RX ORDER — BACLOFEN 10 MG/1
10 TABLET ORAL ONCE
Status: COMPLETED | OUTPATIENT
Start: 2023-03-01 | End: 2023-03-01

## 2023-03-01 RX ORDER — INSULIN GLARGINE 100 [IU]/ML
13 INJECTION, SOLUTION SUBCUTANEOUS DAILY
Status: DISCONTINUED | OUTPATIENT
Start: 2023-03-01 | End: 2023-03-02 | Stop reason: HOSPADM

## 2023-03-01 RX ORDER — METHOCARBAMOL 750 MG/1
750 TABLET, FILM COATED ORAL 4 TIMES DAILY PRN
Status: DISCONTINUED | OUTPATIENT
Start: 2023-03-01 | End: 2023-03-02 | Stop reason: HOSPADM

## 2023-03-01 RX ADMIN — INSULIN LISPRO 1 UNITS: 100 INJECTION, SOLUTION INTRAVENOUS; SUBCUTANEOUS at 10:00

## 2023-03-01 RX ADMIN — SODIUM CHLORIDE: 9 INJECTION, SOLUTION INTRAVENOUS at 00:43

## 2023-03-01 RX ADMIN — TAMSULOSIN HYDROCHLORIDE 0.4 MG: 0.4 CAPSULE ORAL at 09:55

## 2023-03-01 RX ADMIN — METHOCARBAMOL 750 MG: 100 INJECTION INTRAMUSCULAR; INTRAVENOUS at 07:09

## 2023-03-01 RX ADMIN — BACLOFEN 20 MG: 10 TABLET ORAL at 20:21

## 2023-03-01 RX ADMIN — OXYCODONE HYDROCHLORIDE 10 MG: 10 TABLET ORAL at 09:53

## 2023-03-01 RX ADMIN — INSULIN LISPRO 3 UNITS: 100 INJECTION, SOLUTION INTRAVENOUS; SUBCUTANEOUS at 13:34

## 2023-03-01 RX ADMIN — ENOXAPARIN SODIUM 40 MG: 100 INJECTION SUBCUTANEOUS at 09:56

## 2023-03-01 RX ADMIN — ACETAMINOPHEN 1000 MG: 500 TABLET ORAL at 07:05

## 2023-03-01 RX ADMIN — ACETAMINOPHEN 1000 MG: 500 TABLET ORAL at 20:21

## 2023-03-01 RX ADMIN — INSULIN LISPRO 3 UNITS: 100 INJECTION, SOLUTION INTRAVENOUS; SUBCUTANEOUS at 10:00

## 2023-03-01 RX ADMIN — FLUTICASONE PROPIONATE 1 SPRAY: 50 SPRAY, METERED NASAL at 09:58

## 2023-03-01 RX ADMIN — ACETAMINOPHEN 1000 MG: 500 TABLET ORAL at 13:33

## 2023-03-01 RX ADMIN — GABAPENTIN 100 MG: 100 CAPSULE ORAL at 09:55

## 2023-03-01 RX ADMIN — BACLOFEN 10 MG: 10 TABLET ORAL at 11:41

## 2023-03-01 RX ADMIN — GABAPENTIN 100 MG: 100 CAPSULE ORAL at 13:33

## 2023-03-01 RX ADMIN — GABAPENTIN 100 MG: 100 CAPSULE ORAL at 20:21

## 2023-03-01 RX ADMIN — METHOCARBAMOL TABLETS 750 MG: 750 TABLET, COATED ORAL at 16:55

## 2023-03-01 RX ADMIN — METHOCARBAMOL 750 MG: 100 INJECTION INTRAMUSCULAR; INTRAVENOUS at 00:43

## 2023-03-01 RX ADMIN — KETOROLAC TROMETHAMINE 30 MG: 30 INJECTION, SOLUTION INTRAMUSCULAR; INTRAVENOUS at 03:19

## 2023-03-01 RX ADMIN — INSULIN GLARGINE 13 UNITS: 100 INJECTION, SOLUTION SUBCUTANEOUS at 10:00

## 2023-03-01 RX ADMIN — INSULIN LISPRO 1 UNITS: 100 INJECTION, SOLUTION INTRAVENOUS; SUBCUTANEOUS at 13:34

## 2023-03-01 RX ADMIN — BACLOFEN 10 MG: 10 TABLET ORAL at 09:55

## 2023-03-01 RX ADMIN — PANTOPRAZOLE SODIUM 40 MG: 40 TABLET, DELAYED RELEASE ORAL at 07:05

## 2023-03-01 RX ADMIN — OXYCODONE HYDROCHLORIDE 15 MG: 5 TABLET ORAL at 20:21

## 2023-03-01 RX ADMIN — OXYCODONE HYDROCHLORIDE 10 MG: 10 TABLET ORAL at 16:55

## 2023-03-01 RX ADMIN — INSULIN LISPRO 3 UNITS: 100 INJECTION, SOLUTION INTRAVENOUS; SUBCUTANEOUS at 18:17

## 2023-03-01 RX ADMIN — DIAZEPAM 10 MG: 5 TABLET ORAL at 20:21

## 2023-03-01 RX ADMIN — OXYCODONE HYDROCHLORIDE 10 MG: 10 TABLET ORAL at 03:22

## 2023-03-01 ASSESSMENT — PAIN SCALES - GENERAL
PAINLEVEL_OUTOF10: 10
PAINLEVEL_OUTOF10: 6
PAINLEVEL_OUTOF10: 4
PAINLEVEL_OUTOF10: 4
PAINLEVEL_OUTOF10: 5
PAINLEVEL_OUTOF10: 9
PAINLEVEL_OUTOF10: 6

## 2023-03-01 ASSESSMENT — PAIN - FUNCTIONAL ASSESSMENT
PAIN_FUNCTIONAL_ASSESSMENT: PREVENTS OR INTERFERES SOME ACTIVE ACTIVITIES AND ADLS
PAIN_FUNCTIONAL_ASSESSMENT: PREVENTS OR INTERFERES WITH MANY ACTIVE NOT PASSIVE ACTIVITIES

## 2023-03-01 ASSESSMENT — PAIN DESCRIPTION - ORIENTATION
ORIENTATION: MID;UPPER
ORIENTATION: MID

## 2023-03-01 ASSESSMENT — PAIN DESCRIPTION - DESCRIPTORS
DESCRIPTORS: ACHING
DESCRIPTORS: SPASM
DESCRIPTORS: ACHING
DESCRIPTORS: ACHING;CRAMPING
DESCRIPTORS: ACHING;DISCOMFORT;STABBING

## 2023-03-01 ASSESSMENT — PAIN DESCRIPTION - FREQUENCY: FREQUENCY: CONTINUOUS

## 2023-03-01 ASSESSMENT — PAIN DESCRIPTION - LOCATION
LOCATION: BACK
LOCATION: BACK;GENERALIZED

## 2023-03-01 ASSESSMENT — PAIN DESCRIPTION - PAIN TYPE: TYPE: CHRONIC PAIN

## 2023-03-01 ASSESSMENT — PAIN DESCRIPTION - ONSET: ONSET: ON-GOING

## 2023-03-01 NOTE — CARE COORDINATION
Discussed discharge plan with patient. Discussed therapy recs for home, patient says he will return home. .   Patient declines home care bc says he can't participate in therapy right now due to pain. Patient aware to follow up with Neurosurgery at [facility] (or possibly a pain management doctor) for eval of baclofen pump per neurosx recs. No anticipated dc needs.     Electronically signed by Constance Galvan RN Case Management on 3/1/2023 at 12:38 PM

## 2023-03-01 NOTE — PROGRESS NOTES
[facility] Medicine Progress Note     Date:  3/1/2023    PCP: Raffy Akbar (Tel: [phone])    Date of Admission: 2/25/2023    Chief complaint:   Chief Complaint   Patient presents with    Other     Muscle spasm starting at 1pm today all over       Brief admission history: 80-year-old female with history of type I diabetes type 2, hyperlipidemia, cervical spinal cord injury, who presents to the emergency room with complaints of intractable diffuse back spasm, onset around 1 PM on 2/25/2023. He denies new injury. While in the emergency room, he was tried multiple IV narcotics, including Dilaudid, with significant improvement of symptoms. He was also tried on muscle relaxants with no improvement of symptoms. He received a dose of Valium, with some improvement. Following multiple narcotics, he also required multiple doses of IV Narcan due to respiratory depression. Assessment/plan:  Diffuse muscle spasm of the back. Patient with history of cervical spinal cord injury. CT-cervical spine revealed status post posterior fusion of C1-C2 that is grossly unremarkable, moderate degenerative disc disease throughout the lower cervical spine with no acute abnormality, moderate disc osteophyte complexes at C5-C6 and C6-C7. CT-lumbar spine with chronic L5 superior endplate Schmorl's node, mild central spinal canal narrowing at L3-L4. MRI-cervical spine with syringomyelia. MRI-thoracic and lumbar spine with no significant finding to explain ongoing spasms. Patient has been evaluated by neurosurgery and neurology. Neurosurgery recommended patient follow-up at [facility] with a physician that does baclofen pump trials. Home dose of baclofen has been titrated. He has also been on multiple narcotics, Valium, in addition to muscle relaxants. Patient will follow-up as [facility] at time of discharge. Rhabdomyolysis, mild, much improved. Secondary to diffuse spasm.   Patient was on intravenous fluid, which has since been discontinued. Continue oral fluid intake. BPH with urinary retention. Continue Flomax. Current urinary retention likely secondary to multiple sedating medications. Smith catheter currently in place. Will give post void trial prior to discharge. History of type 1 diabetes, well-controlled with most recent hemoglobin A1c of 6.3. Continue home dose of Lantus, sliding scale insulin. Monitor Accu-Chek closely and adjust insulin dose as needed. Ketonuria. Likely secondary to poor oral intake, ketogenesis. Laboratory studies from this morning are consistent with DKA. Recheck renal function panel, venous blood gas. Other comorbidities: history of hyperlipidemia, cervical spinal cord injury, underweight with BMI of 19 kg/m². Diet: ADULT DIET; Regular; 4 carb choices (60 gm/meal)    Code status: Full Code   ----------  Subjective  Patient willing to try to go to [facility] at discharge. Still having lots of spasms. Objective  Physical exam:  Vitals: /83   Pulse 64   Temp 97.7 °F (36.5 °C) (Oral)   Resp 13   Ht 6' (1.829 m)   Wt 153 lb 7 oz (69.6 kg)   SpO2 97%   BMI 20.81 kg/m²   Gen/overall appearance: Not in acute distress. Alert. Oriented x3. Head: Normocephalic, atraumatic  Eyes: EOMI, good acuity  ENT: Oral mucosa moist  Neck: No JVD, thyromegaly  CVS: Nml S1S2, no MRG, RRR  Pulm: Clear bilaterally. No crackles/wheezes  Gastrointestinal: Soft, NT/ND, +BS  Musculoskeletal: No edema. Warm  Neuro: No focal deficit. Moves extremity spontaneously. Psychiatry: Appropriate affect. Not agitated. Skin: Warm, dry with normal turgor. No rash  Capillary refill: Brisk,< 3 seconds   Peripheral Pulses: +2 palpable, equal bilaterally      24HR INTAKE/OUTPUT:    Intake/Output Summary (Last 24 hours) at 3/1/2023 0932  Last data filed at 3/1/2023 0747  Gross per 24 hour   Intake 500 ml   Output 2350 ml   Net -1850 ml       I/O last 3 completed shifts: In: 2654.8 [P.O.:700; I.V.:1367.4;  IV Piggyback:587.4]  Out: 2650 [Urine:2650]  I/O this shift:  In: -   Out: 100 [Urine:100]    Meds:    insulin glargine  13 Units SubCUTAneous Daily    baclofen  20 mg Oral BID    oxyCODONE  15 mg Oral Nightly    gabapentin  100 mg Oral TID    insulin lispro  3 Units SubCUTAneous TID     sodium chloride flush  5-40 mL IntraVENous 2 times per day    enoxaparin  40 mg SubCUTAneous Daily    insulin lispro  0-4 Units SubCUTAneous TID WC    insulin lispro  0-4 Units SubCUTAneous Nightly    acetaminophen  1,000 mg Oral 3 times per day    lidocaine  1 patch TransDERmal Daily    pantoprazole  40 mg Oral QAM AC    tamsulosin  0.4 mg Oral Daily    fluticasone  1 spray Each Nostril Daily     Infusions:    sodium chloride 100 mL/hr at 03/01/23 0043    dextrose       PRN Meds: docusate sodium, diphenhydrAMINE, naloxone, sodium chloride flush, sodium chloride, polyethylene glycol, ondansetron, glucose, dextrose bolus **OR** dextrose bolus, glucagon (rDNA), dextrose, morphine, oxyCODONE, diazePAM    Labs/imaging:  CBC:   Recent Labs     02/27/23  0710 02/28/23  0709 03/01/23  0618   WBC 7.5 5.6 6.7   HGB 13.2* 13.2* 13.7    233 238       BMP:    Recent Labs     02/27/23  0710 02/28/23  0709 03/01/23  0618    137 134*   K 3.5 4.4 4.5    104 98*   CO2 22 23 21   BUN 11 12 14   CREATININE 0.9 0.8* 0.8*   GLUCOSE 87 82 257*     Hepatic:   Recent Labs     02/27/23  0710 02/28/23  0709 03/01/23  0618   AST 36 24 16   ALT 14 12 11   BILITOT 0.6 0.5 0.6   ALKPHOS 47 57 63         Calixto Engel MD  -------------------------------  Rounding hospitalist

## 2023-03-01 NOTE — PLAN OF CARE
Problem: ABCDS Injury Assessment  Goal: Absence of physical injury  Outcome: Progressing     Problem: Pain  Goal: Verbalizes/displays adequate comfort level or baseline comfort level  Outcome: Progressing     Problem: Anxiety  Goal: Will report anxiety at manageable levels  Description: INTERVENTIONS:  1. Administer medication as ordered  2. Teach and rehearse alternative coping skills  3. Provide emotional support with 1:1 interaction with staff  Outcome: Progressing     Problem: Behavior  Goal: Pt/Family maintain appropriate behavior and adhere to behavioral management agreement, if implemented  Description: INTERVENTIONS:  1. Assess patient/family's coping skills and  non-compliant behavior (including use of illegal substances)  2. Notify security of behavior or suspected illegal substances which indicate the need for search of the family and/or belongings  3. Encourage verbalization of thoughts and concerns in a socially appropriate manner  4. Utilize positive, consistent limit setting strategies supporting safety of patient, staff and others  5. Encourage participation in the decision making process about the behavioral management agreement  6. If a visitor's behavior poses a threat to safety call refer to organization policy.   7. Initiate consult with , Psychosocial CNS, Spiritual Care as appropriate  Outcome: Progressing

## 2023-03-01 NOTE — PROGRESS NOTES
Occupational Therapy  Unable to see pt at this time due to pt currently in increased pain and requesting to defer at this time. Will follow up with pt as time allows. Continue with POC.     Aleks Duran, OTR/L

## 2023-03-01 NOTE — DISCHARGE INSTR - COC
Continuity of Care Form    Patient Name: Ajay Wayne   :  1970  MRN:  [id number]    Admit date:  2023  Discharge date:  ***    Code Status Order: Full Code   Advance Directives:     Admitting Physician:  William Guevara DO  PCP: Sneha Arshad    Discharging Nurse: [facility] Unit/Room#: Q5Q-7782/8469-22  Discharging Unit Phone Number: ***    Emergency Contact:   Extended Emergency Contact Information  Primary Emergency Contact: [facility]  Address: [address]  Home Phone: [phone]  Work Phone: [phone]  Relation: Spouse    Past Surgical History:  Past Surgical History:   Procedure Laterality Date    ANTERIOR CRUCIATE LIGAMENT REPAIR  2011    right    CLAVICLE SURGERY  2006    HIP SURGERY  1989    pins       Immunization History: There is no immunization history on file for this patient.     Active Problems:  Patient Active Problem List   Diagnosis Code    S/P ACL reconstruction Z98.890    Intractable back pain M54.9    DM2 (diabetes mellitus, type 2) (HCC) E11.9    HLD (hyperlipidemia) E78.5       Isolation/Infection:   Isolation            No Isolation          Patient Infection Status       None to display            Nurse Assessment:  Last Vital Signs: BP (!) 140/84   Pulse 82   Temp 98.2 °F (36.8 °C) (Oral)   Resp 16   Ht 6' (1.829 m)   Wt 153 lb 7 oz (69.6 kg)   SpO2 99%   BMI 20.81 kg/m²     Last documented pain score (0-10 scale): Pain Level: 5  Last Weight:   Wt Readings from Last 1 Encounters:   23 153 lb 7 oz (69.6 kg)     Mental Status:  {IP PT MENTAL STATUS:}    IV Access:  { JEREMY IV ACCESS:853728053}    Nursing Mobility/ADLs:  Walking   {CHP DME MIET:019462163}  Transfer  {CHP DME CSVH:592169955}  Bathing  {CHP DME RRZF:715402124}  Dressing  {CHP DME JNGQ:203717821}  Toileting  {CHP DME RAZ:470239467}  Feeding  {CHP DME JCQB:237402130}  Med Admin  {CHP DME YRUN:483216376}  Med Delivery [facility] JEREMY MED Delivery:644435787}    Wound Care Documentation and Therapy:        Elimination:  Continence: Bowel: {YES / RN:36610}  Bladder: {YES / FR:32954}  Urinary Catheter: {Urinary Catheter:460082585}   Colostomy/Ileostomy/Ileal Conduit: {YES / EJ:91182}       Date of Last BM: ***    Intake/Output Summary (Last 24 hours) at 3/1/2023 1808  Last data filed at 3/1/2023 1539  Gross per 24 hour   Intake 840 ml   Output 1325 ml   Net -485 ml     I/O last 3 completed shifts: In: 2654.8 [P.O.:700;  I.V.:1367.4; IV Piggyback:587.4]  Out: 2650 [Urine:2650]    Safety Concerns:     [address] [employer] Safety Concerns:622392460}    Impairments/Disabilities:      {[facility] Impairments/Disabilities:524626595}    Nutrition Therapy:  Current Nutrition Therapy:   [address] [employer] Diet List:307760622}    Routes of Feeding: {[facility] DME Other Feedings:274492325}  Liquids: {Slp liquid thickness:56014}  Daily Fluid Restriction: {[facility] DME Yes amt example:781597492}  Last Modified Barium Swallow with Video (Video Swallowing Test): {Done Not Done VRQI:106990745}    Treatments at the Time of Hospital Discharge:   Respiratory Treatments: ***  Oxygen Therapy:  {Therapy; copd oxygen:68365}  Ventilator:    {[facility] Vent HKWU:217412153}    Rehab Therapies: {THERAPEUTIC INTERVENTION:8043259072}  Weight Bearing Status/Restrictions: [address]  Weight Bearin}  Other Medical Equipment (for information only, NOT a DME order):  {EQUIPMENT:159815325}  Other Treatments: ***    Patient's personal belongings (please select all that are sent with patient):  {[facility] DME Belongings:841602335}    RN SIGNATURE:  {Esignature:437292163}    CASE MANAGEMENT/SOCIAL WORK SECTION    Inpatient Status Date: ***    Readmission Risk Assessment Score:  Readmission Risk              Risk of Unplanned Readmission:  13           Discharging to Facility/ Agency   Name:   Address:  Phone:  Fax:    Dialysis Facility (if applicable)   Name:  Address:  Dialysis Schedule:  Phone:  Fax:    Case Manager/ signature: {Esignature:121670145}    PHYSICIAN SECTION    Prognosis: Fair    Condition at Discharge: Stable    Rehab Potential (if transferring to Rehab): Fair    Recommended Labs or Other Treatments After Discharge: NA    Physician Certification: I certify the above information and transfer of Zac Burden  is necessary for the continuing treatment of the diagnosis listed and that he requires [place] for less 30 days.      Update Admission H&P: No change in H&P    PHYSICIAN SIGNATURE:  Electronically signed by Mika Bustillos MD on 3/1/23 at 6:08 PM EST

## 2023-03-02 VITALS
HEART RATE: 73 BPM | RESPIRATION RATE: 20 BRPM | WEIGHT: 154.54 LBS | DIASTOLIC BLOOD PRESSURE: 98 MMHG | BODY MASS INDEX: 20.93 KG/M2 | SYSTOLIC BLOOD PRESSURE: 156 MMHG | HEIGHT: 72 IN | TEMPERATURE: 97.9 F | OXYGEN SATURATION: 99 %

## 2023-03-02 LAB
GLUCOSE BLD-MCNC: 100 MG/DL (ref 70–99)
GLUCOSE BLD-MCNC: 140 MG/DL (ref 70–99)
PERFORMED ON: ABNORMAL
PERFORMED ON: ABNORMAL

## 2023-03-02 PROCEDURE — 6370000000 HC RX 637 (ALT 250 FOR IP): Performed by: INTERNAL MEDICINE

## 2023-03-02 PROCEDURE — 6360000002 HC RX W HCPCS: Performed by: STUDENT IN AN ORGANIZED HEALTH CARE EDUCATION/TRAINING PROGRAM

## 2023-03-02 PROCEDURE — 94760 N-INVAS EAR/PLS OXIMETRY 1: CPT

## 2023-03-02 RX ORDER — POLYETHYLENE GLYCOL 3350 17 G/17G
17 POWDER, FOR SOLUTION ORAL DAILY PRN
Qty: 527 G | Refills: 0 | Status: SHIPPED | OUTPATIENT
Start: 2023-03-02 | End: 2023-04-01

## 2023-03-02 RX ORDER — OXYCODONE HYDROCHLORIDE 10 MG/1
10 TABLET ORAL EVERY 4 HOURS PRN
Qty: 42 TABLET | Refills: 0 | Status: SHIPPED | OUTPATIENT
Start: 2023-03-02 | End: 2023-03-09

## 2023-03-02 RX ORDER — GABAPENTIN 100 MG/1
100 CAPSULE ORAL 3 TIMES DAILY
Qty: 21 CAPSULE | Refills: 0 | Status: SHIPPED | OUTPATIENT
Start: 2023-03-02 | End: 2023-03-09

## 2023-03-02 RX ORDER — PSEUDOEPHEDRINE HCL 30 MG
100 TABLET ORAL 2 TIMES DAILY PRN
Qty: 60 CAPSULE | Refills: 0 | Status: SHIPPED | OUTPATIENT
Start: 2023-03-02

## 2023-03-02 RX ORDER — BACLOFEN 20 MG/1
20 TABLET ORAL 2 TIMES DAILY
Qty: 14 TABLET | Refills: 0 | Status: SHIPPED | OUTPATIENT
Start: 2023-03-02 | End: 2023-03-09

## 2023-03-02 RX ORDER — DIAZEPAM 10 MG/1
10 TABLET ORAL EVERY 6 HOURS PRN
Qty: 28 TABLET | Refills: 0 | Status: SHIPPED | OUTPATIENT
Start: 2023-03-02 | End: 2023-03-09

## 2023-03-02 RX ADMIN — INSULIN GLARGINE 13 UNITS: 100 INJECTION, SOLUTION SUBCUTANEOUS at 08:58

## 2023-03-02 RX ADMIN — GABAPENTIN 100 MG: 100 CAPSULE ORAL at 08:58

## 2023-03-02 RX ADMIN — OXYCODONE HYDROCHLORIDE 10 MG: 10 TABLET ORAL at 07:43

## 2023-03-02 RX ADMIN — ENOXAPARIN SODIUM 40 MG: 100 INJECTION SUBCUTANEOUS at 09:00

## 2023-03-02 RX ADMIN — TAMSULOSIN HYDROCHLORIDE 0.4 MG: 0.4 CAPSULE ORAL at 08:58

## 2023-03-02 RX ADMIN — PANTOPRAZOLE SODIUM 40 MG: 40 TABLET, DELAYED RELEASE ORAL at 06:18

## 2023-03-02 RX ADMIN — ACETAMINOPHEN 1000 MG: 500 TABLET ORAL at 12:50

## 2023-03-02 RX ADMIN — ACETAMINOPHEN 1000 MG: 500 TABLET ORAL at 06:18

## 2023-03-02 RX ADMIN — BACLOFEN 20 MG: 10 TABLET ORAL at 08:58

## 2023-03-02 RX ADMIN — INSULIN LISPRO 3 UNITS: 100 INJECTION, SOLUTION INTRAVENOUS; SUBCUTANEOUS at 12:50

## 2023-03-02 RX ADMIN — INSULIN LISPRO 3 UNITS: 100 INJECTION, SOLUTION INTRAVENOUS; SUBCUTANEOUS at 08:59

## 2023-03-02 ASSESSMENT — PAIN SCALES - GENERAL
PAINLEVEL_OUTOF10: 3
PAINLEVEL_OUTOF10: 3
PAINLEVEL_OUTOF10: 4
PAINLEVEL_OUTOF10: 0

## 2023-03-02 ASSESSMENT — PAIN - FUNCTIONAL ASSESSMENT: PAIN_FUNCTIONAL_ASSESSMENT: PREVENTS OR INTERFERES SOME ACTIVE ACTIVITIES AND ADLS

## 2023-03-02 ASSESSMENT — PAIN DESCRIPTION - DESCRIPTORS: DESCRIPTORS: ACHING

## 2023-03-02 ASSESSMENT — PAIN DESCRIPTION - LOCATION: LOCATION: BACK

## 2023-03-02 ASSESSMENT — PAIN DESCRIPTION - FREQUENCY: FREQUENCY: CONTINUOUS

## 2023-03-02 ASSESSMENT — PAIN DESCRIPTION - ONSET: ONSET: ON-GOING

## 2023-03-02 ASSESSMENT — PAIN DESCRIPTION - PAIN TYPE: TYPE: CHRONIC PAIN

## 2023-03-02 NOTE — PROGRESS NOTES
Hospital Medicine Progress Note     Date:  3/2/2023    PCP: Goldie Zamora (Tel: [phone])    Date of Admission: 2/25/2023    Chief complaint:   Chief Complaint   Patient presents with    Other     Muscle spasm starting at 1pm today all over       Brief admission history: 60-year-old female with history of type I diabetes type 2, hyperlipidemia, cervical spinal cord injury, who presents to the emergency room with complaints of intractable diffuse back spasm, onset around 1 PM on 2/25/2023. He denies new injury. While in the emergency room, he was tried multiple IV narcotics, including Dilaudid, with significant improvement of symptoms. He was also tried on muscle relaxants with no improvement of symptoms. He received a dose of Valium, with some improvement. Following multiple narcotics, he also required multiple doses of IV Narcan due to respiratory depression. Assessment/plan:  Diffuse muscle spasm of the back. Patient with history of cervical spinal cord injury. CT-cervical spine revealed status post posterior fusion of C1-C2 that is grossly unremarkable, moderate degenerative disc disease throughout the lower cervical spine with no acute abnormality, moderate disc osteophyte complexes at C5-C6 and C6-C7. CT-lumbar spine with chronic L5 superior endplate Schmorl's node, mild central spinal canal narrowing at L3-L4. MRI-cervical spine with syringomyelia. MRI-thoracic and lumbar spine with no significant finding to explain ongoing spasms. Patient has been evaluated by neurosurgery and neurology. Neurosurgery recommended patient follow-up at [facility] with a physician that does baclofen pump trials. Home dose of baclofen has been titrated. He has also been on multiple narcotics, Valium, in addition to muscle relaxants. Patient will follow-up as Dracut at time of discharge. He is being discharged on oxycodone, valium, baclofen and neurontin. Rhabdomyolysis, mild, much improved.   Secondary to diffuse spasm. Patient was on intravenous fluid, which has since been discontinued. Continue oral fluid intake. BPH with urinary retention. Continue Flomax. Current urinary retention likely secondary to multiple sedating medications. He required gracia catheter during hospital stay - gracia to be removed and postvoid trial done prior to discharge. History of type 1 diabetes, well-controlled with most recent hemoglobin A1c of 6.3. Continue home dose of Lantus, sliding scale insulin. Resume home insulin dose at discharge. Ketonuria. Likely secondary to poor oral intake, ketogenesis. Blood gas and other lab study results not consistent with DKA. Other comorbidities: history of hyperlipidemia, cervical spinal cord injury, underweight with BMI of 19 kg/m². Diet: ADULT DIET; Regular; 4 carb choices (60 gm/meal)    Code status: Full Code   ----------  Subjective  Pain is better today. Open to going home today. Objective  Physical exam:  Vitals: BP (!) 156/98   Pulse 73   Temp 97.9 °F (36.6 °C) (Oral)   Resp 20   Ht 6' (1.829 m)   Wt 154 lb 8.7 oz (70.1 kg)   SpO2 99%   BMI 20.96 kg/m²   Gen/overall appearance: Not in acute distress. Alert. Oriented x3. Head: Normocephalic, atraumatic  Eyes: EOMI, good acuity  ENT: Oral mucosa moist  Neck: No JVD, thyromegaly  CVS: Nml S1S2, no MRG, RRR  Pulm: Clear bilaterally. No crackles/wheezes  Gastrointestinal: Soft, NT/ND, +BS  Musculoskeletal: No edema. Warm  Neuro: No focal deficit. Moves extremity spontaneously. Psychiatry: Appropriate affect. Not agitated. Skin: Warm, dry with normal turgor. No rash  Capillary refill: Brisk,< 3 seconds   Peripheral Pulses: +2 palpable, equal bilaterally      24HR INTAKE/OUTPUT:    Intake/Output Summary (Last 24 hours) at 3/2/2023 0948  Last data filed at 3/2/2023 59  Gross per 24 hour   Intake 1080 ml   Output 2400 ml   Net -1320 ml       I/O last 3 completed shifts:   In: 1200 [P.O.:1200]  Out: 3250 [RNMOE:0221]  No intake/output data recorded.     Meds:    insulin glargine  13 Units SubCUTAneous Daily    baclofen  20 mg Oral BID    oxyCODONE  15 mg Oral Nightly    gabapentin  100 mg Oral TID    insulin lispro  3 Units SubCUTAneous TID WC    sodium chloride flush  5-40 mL IntraVENous 2 times per day    enoxaparin  40 mg SubCUTAneous Daily    insulin lispro  0-4 Units SubCUTAneous TID WC    insulin lispro  0-4 Units SubCUTAneous Nightly    acetaminophen  1,000 mg Oral 3 times per day    lidocaine  1 patch TransDERmal Daily    pantoprazole  40 mg Oral QAM AC    tamsulosin  0.4 mg Oral Daily    fluticasone  1 spray Each Nostril Daily     Infusions:    sodium chloride 100 mL/hr at 03/01/23 0043    dextrose       PRN Meds: ondansetron, methocarbamol, docusate sodium, diphenhydrAMINE, naloxone, sodium chloride flush, sodium chloride, polyethylene glycol, glucose, dextrose bolus **OR** dextrose bolus, glucagon (rDNA), dextrose, morphine, oxyCODONE, diazePAM    Labs/imaging:  CBC:   Recent Labs     02/28/23  0709 03/01/23  0618   WBC 5.6 6.7   HGB 13.2* 13.7    238       BMP:    Recent Labs     02/28/23  0709 03/01/23  0618    131*  134*   K 4.4 4.5  4.5    96*  98*   CO2 23 20*  21   BUN 12 15  14   CREATININE 0.8* 0.8*  0.8*   GLUCOSE 82 254*  257*       Hepatic:   Recent Labs     02/28/23  0709 03/01/23  0618   AST 24 16   ALT 12 11   BILITOT 0.5 0.6   ALKPHOS 62 63         Robbin Palma MD  -------------------------------  Rounding hospitalist

## 2023-03-02 NOTE — DISCHARGE SUMMARY
Hospital Discharge Summary    Patient's PCP: Gurinder Isaacs  Admit Date: 2/25/2023   Discharge Date: 3/2/2023    Admitting Physician: Dr. Sophia Morris, DO  Discharge Physician: Dr. Lucinda Wakefield MD     Consults:   IP CONSULT TO HOSPITALIST  IP CONSULT TO NEUROSURGERY  IP CONSULT TO NEUROSURGERY  IP CONSULT TO NEUROSURGERY  IP CONSULT TO NEUROLOGY  IP CONSULT TO SOCIAL WORK    Brief HPI: Patient admitted with back pain    Brief hospital course: 28-year-old female with history of type I diabetes type 2, hyperlipidemia, cervical spinal cord injury, who presents to the emergency room with complaints of intractable diffuse back spasm, onset around 1 PM on 2/25/2023. He denies new injury. While in the emergency room, he was tried multiple IV narcotics, including Dilaudid, with significant improvement of symptoms. He was also tried on muscle relaxants with no improvement of symptoms. He received a dose of Valium, with some improvement. Following multiple narcotics, he also required multiple doses of IV Narcan due to respiratory depression. Assessment/plan:  Diffuse muscle spasm of the back. Patient with history of cervical spinal cord injury. CT-cervical spine revealed status post posterior fusion of C1-C2 that is grossly unremarkable, moderate degenerative disc disease throughout the lower cervical spine with no acute abnormality, moderate disc osteophyte complexes at C5-C6 and C6-C7. CT-lumbar spine with chronic L5 superior endplate Schmorl's node, mild central spinal canal narrowing at L3-L4. MRI-cervical spine with syringomyelia. MRI-thoracic and lumbar spine with no significant finding to explain ongoing spasms. Patient has been evaluated by neurosurgery and neurology. Neurosurgery recommended patient follow-up at [facility] with a physician that does baclofen pump trials. Home dose of baclofen has been titrated. He has also been on multiple narcotics, Valium, in addition to muscle relaxants. Patient will follow-up as Dracut at time of discharge. He is being discharged on oxycodone, valium, baclofen and neurontin. Rhabdomyolysis, mild, much improved. Secondary to diffuse spasm. Patient was on intravenous fluid, which has since been discontinued. Continue oral fluid intake. BPH with urinary retention. Continue Flomax. Current urinary retention likely secondary to multiple sedating medications. He required gracia catheter during hospital stay - gracia to be removed and postvoid trial done prior to discharge. History of type 1 diabetes, well-controlled with most recent hemoglobin A1c of 6.3. Continue home dose of Lantus, sliding scale insulin. Resume home insulin dose at discharge. Ketonuria. Likely secondary to poor oral intake, ketogenesis. Blood gas and other lab study results not consistent with DKA. Other comorbidities: history of hyperlipidemia, cervical spinal cord injury, underweight with BMI of 19 kg/m². Invasive procedures:  None. Discharge Diagnoses:   See above. Physical Exam: BP (!) 156/98   Pulse 73   Temp 97.9 °F (36.6 °C) (Oral)   Resp 20   Ht 6' (1.829 m)   Wt 154 lb 8.7 oz (70.1 kg)   SpO2 99%   BMI 20.96 kg/m²   Gen/overall appearance: Not in acute distress. Alert. Oriented X3  Head: Normocephalic, atraumatic  Eyes: EOMI, good acuity  ENT: Oral mucosa moist  Neck: No JVD, thyromegaly  CVS: Nml S1S2, no MRG, RRR  Pulm: Clear bilaterally. No crackles/wheezes  Gastrointestinal: Soft, NT/ND, +BS  Musculoskeletal: No edema. Warm  Neuro: No focal deficit. Moves extremity spontaneously. Psychiatry: Appropriate affect. Not agitated. Skin: Warm, dry with normal turgor.  No rash  Capillary refill: Brisk,< 3 seconds   Peripheral Pulses: +2 palpable, equal bilaterally     Significant diagnostic studies that may require follow up:  CT CERVICAL SPINE WO CONTRAST    Result Date: 2/26/2023  EXAMINATION: CT OF THE CERVICAL SPINE WITHOUT CONTRAST 2/26/2023 12:46 am TECHNIQUE: CT of the cervical spine was performed without the administration of intravenous contrast. Multiplanar reformatted images are provided for review. Automated exposure control, iterative reconstruction, and/or weight based adjustment of the mA/kV was utilized to reduce the radiation dose to as low as reasonably achievable. COMPARISON: None. HISTORY: ORDERING SYSTEM PROVIDED HISTORY: Neck and back pain, no injury TECHNOLOGIST PROVIDED HISTORY: Reason for exam:->Neck and back pain, no injury Decision Support Exception - unselect if not a suspected or confirmed emergency medical condition->Emergency Medical Condition (MA) Reason for Exam: Neck and back pain Relevant Medical/Surgical History: Neck and shoulder surgery FINDINGS: BONES/ALIGNMENT: The cervical vertebra are well aligned. There is moderate disc space narrowing throughout the lower cervical spine which is most prominent at C5-6 and C6-7 with prominent osteophytes at both levels. No fracture or subluxation is seen. There are pedicle screws extending through the lateral masses of C1 and the pedicles of C2 posteriorly which appear intact in good position and are held in place with surgical rods posteriorly. There is no evidence of loosening and the screws are intact. The atlantoaxial joint is otherwise intact. DEGENERATIVE CHANGES: There are moderate disc osteophyte complexes at C5-6 and C6-7 causing moderate anterior dural sac effacement at both levels. And mild uncinate spurring causing mild neural foraminal narrowing bilaterally at both levels. Posterior elements are intact SOFT TISSUES: There is no prevertebral soft tissue swelling. There is mild biapical pleural thickening and scarring. Status post posterior fusion of C1-2 which is grossly unremarkable. Moderate degenerative disc changes throughout the lower cervical spine with no acute abnormality seen. Moderate disc osteophyte complexes at C5-6 and C6-7.   If the patient is persistently symptomatic, suggest MRI for further characterization these disc space levels. CT LUMBAR SPINE WO CONTRAST    Result Date: 2/26/2023  EXAMINATION: CT OF THE LUMBAR SPINE WITHOUT CONTRAST  2/25/2023 TECHNIQUE: CT of the lumbar spine was performed without the administration of intravenous contrast. Multiplanar reformatted images are provided for review. Adjustment of mA and/or kV according to patient size was utilized. Automated exposure control, iterative reconstruction, and/or weight based adjustment of the mA/kV was utilized to reduce the radiation dose to as low as reasonably achievable. COMPARISON: None HISTORY: ORDERING SYSTEM PROVIDED HISTORY: Low back pain TECHNOLOGIST PROVIDED HISTORY: Reason for exam:->Low back pain Decision Support Exception - unselect if not a suspected or confirmed emergency medical condition->Emergency Medical Condition (MA) Reason for Exam: Lower back pain FINDINGS: BONES/ALIGNMENT: The spine is labeled such that there is a transitional L5 type vertebral body. Alignment of the lumbar spine is normal.  There is a chronic L5 superior endplate Schmorl's node. There is no evidence of an acute fracture. DEGENERATIVE CHANGES: Mild multilevel degenerative disc disease is noted in the lumbar spine, greatest at L4-L5 with associated vacuum phenomenon in the disc space. SOFT TISSUES/RETROPERITONEUM: Stool is noted in the colon. Vascular calcifications are noted in the aorta and its branch vessels. Gravity dependent atelectasis is noted in the lungs. L1-L2: No disc herniation, central spinal canal narrowing, or foraminal narrowing. L2-L3: There is a minimal disc bulge. Minimal central spinal canal narrowing. No foraminal narrowing. L3-L4: There is a circumferential disc bulge. Mild central spinal canal narrowing. Bilateral facet arthropathy is noted. Mild bilateral foraminal narrowing, asymmetric on the left. L4-L5: There is a circumferential disc bulge. Bilateral facet arthropathy. No central spinal canal narrowing. Moderate-severe right and mild left foraminal narrowing. L5-S1: No disc herniation, central spinal canal narrowing, or foraminal narrowing. 1. Chronic L5 superior endplate Schmorl's node. 2. Mild central spinal canal narrowing at L3-L4. 3. Foraminal narrowing, as above. MRI CERVICAL SPINE WO CONTRAST    Result Date: 2/26/2023  EXAMINATION: MRI OF THE CERVICAL SPINE WITHOUT CONTRAST 2/26/2023 7:52 pm TECHNIQUE: Multiplanar multisequence MRI of the cervical spine was performed without the administration of intravenous contrast. COMPARISON: None HISTORY: ORDERING SYSTEM PROVIDED HISTORY: diffuse back spasm TECHNOLOGIST PROVIDED HISTORY: Reason for exam:->diffuse back spasm Reason for Exam: diffuse back spasm FINDINGS: The examination is motion degraded. The BONES/ALIGNMENT: There is posterior fusion of C1-C2. Cervical alignment is straightened. Vertebral heights are normal.  There is T1 hypointense, T2 hyperintense signal within the posterior aspects of the vertebral bodies from T1-T3. Bone marrow signal in the cervical vertebrae is benign. SPINAL CORD: There is a 6 x 3 x 9 mm cyst in the central and left paracentral spinal cord at C2 presumably representing a syrinx related to prior trauma. SOFT TISSUES: No paraspinal mass identified. C2-C3: There is no significant disc protrusion, spinal canal stenosis or neural foraminal narrowing. C3-C4: Facet and uncovertebral hypertrophy cause moderate left foraminal stenosis. C4-C5: There is no significant disc protrusion, spinal canal stenosis or neural foraminal narrowing. C5-C6: Disc osteophyte complex and uncovertebral hypertrophy cause mild thecal sac and moderate bilateral foraminal stenosis. C6-C7: Disc osteophyte complex, uncovertebral and facet hypertrophy cause mild thecal sac, severe right foraminal and mild left foraminal stenosis. C7-T1: Facet hypertrophy causes mild left foraminal stenosis.      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 Moderate spondylosis without severe canal stenosis or spinal cord compression. MRI THORACIC SPINE WO CONTRAST    Result Date: 2/26/2023  EXAMINATION: MRI OF THE THORACIC SPINE WITHOUT CONTRAST  2/26/2023 6:29 pm TECHNIQUE: Multiplanar multisequence MRI of the thoracic spine was performed without the administration of intravenous contrast. COMPARISON: None HISTORY: ORDERING SYSTEM PROVIDED HISTORY: Diffuse back spasm TECHNOLOGIST PROVIDED HISTORY: Reason for exam:->Diffuse back spasm Reason for Exam: diffuse back spasm FINDINGS: BONES/ALIGNMENT: The patient's chest appear to be rotated to the right. Otherwise there is no obvious scoliosis in thoracic spine. There is mild generalized kyphotic curve in thoracic spine without any malalignment. There is no evidence of fracture or compression or bone contusion or marrow replacing lesion in the thoracic spine. Evidence of minimal degenerative disc disease at T1-T2, T2-T3, T3-T4, T4-T5 and T5-T6 levels without any obvious posterior disc protrusion or herniation and without central stenosis or neural foraminal stenosis. At T6-T7, T7-T8 and T8-T9 levels, evidence of mild to moderate degenerative disc disease with moderate narrowing of disc space, partial desiccation of disc, mild posterior disc bulge and mild posterior spurring from endplates but without definable focal disc protrusion or herniation. There is no significant central stenosis and no neural foraminal stenosis. At T9-T10 level, there is no diagnostic finding. At T10-T11 level, mild to moderate degenerative disc disease. Mild generalized annular bulging of disc posteriorly without disc herniation or protrusion. No central stenosis or neural foraminal stenosis. At T11-T12 and T12-L1 levels, minimal degenerative disc disease without disc protrusion or herniation. No central stenosis or neural foraminal stenosis. SPINAL CORD: No abnormal cord signal is seen.  SOFT TISSUES: No paraspinal mass identified. Evidence of mild to moderate multilevel degenerative disc disease in the midportion and lower portion of thoracic spine. There is no significant posterior disc bulge. No evidence of disc protrusion or herniation at any level in the thoracic spine. No evidence of thoracic central canal stenosis or neural foraminal stenosis at any level in the thoracic spine. Thoracic spinal cord is of normal signal.  No evidence of thoracic spinal cord compression. No evidence of fracture, bone contusion or marrow replacing lesion in thoracic spine. MRI LUMBAR SPINE WO CONTRAST    Result Date: 2/26/2023  EXAMINATION: MRI OF THE LUMBAR SPINE WITHOUT CONTRAST, 2/26/2023 7:17 pm TECHNIQUE: Multiplanar multisequence MRI of the lumbar spine was performed without the administration of intravenous contrast. COMPARISON: None. HISTORY: ORDERING SYSTEM PROVIDED HISTORY: lower back pain TECHNOLOGIST PROVIDED HISTORY: Reason for exam:->lower back pain Reason for Exam: lower pack pain FINDINGS: BONES/ALIGNMENT: There are 5 lumbar vertebral bodies. There is some lumbarization of S1. There is retrolisthesis at L5-S1. Vertebral body heights are maintained. There is no aggressive marrow signal abnormality. SPINAL CORD: The conus terminates normally. SOFT TISSUES: No paraspinal mass identified. L1-L2: There is no significant disc herniation, spinal canal stenosis or neural foraminal narrowing. L2-L3: There is no significant disc herniation, spinal canal stenosis or neural foraminal narrowing. L3-L4: Disc protrusion and facet DJD without significant spinal canal stenosis. There is minimal narrowing of both lateral recesses. No significant neural foraminal stenosis. L4-L5: Disc extrusion, facet DJD, and ligamentum flavum hypertrophy with mild to moderate spinal canal stenosis. There is narrowing in both lateral recesses. There is moderate bilateral neural foraminal stenosis.  L5-S1: Disc protrusion and facet DJD without significant spinal canal stenosis. Moderate bilateral neural foraminal stenosis. 1. Multilevel degenerative change with mild to moderate spinal canal stenosis at L4-5. 2. Multilevel neural foraminal narrowing as above. 3. Anterolisthesis at L5-S1. XR CHEST 1 VIEW    Result Date: 2/25/2023  EXAMINATION: ONE XRAY VIEW OF THE CHEST 2/25/2023 5:43 pm COMPARISON: None. HISTORY: ORDERING SYSTEM PROVIDED HISTORY: Intractable pain TECHNOLOGIST PROVIDED HISTORY: Reason for exam:->Intractable pain Reason for Exam: Intractable pain FINDINGS: The cardial-pericardial silhouette is unremarkable in appearance. The lungs are clear. No pneumothorax is found. No free air is seen. No acute bony abnormality. Remote left clavicle ORIF noted. No acute abnormality identified. Treatments: As above. Discharge Medications:     Medication List        START taking these medications      diazePAM 10 MG tablet  Commonly known as: VALIUM  Take 1 tablet by mouth every 6 hours as needed for Anxiety for up to 7 days. Max Daily Amount: 40 mg     docusate 100 MG Caps  Commonly known as: COLACE, DULCOLAX  Take 100 mg by mouth 2 times daily as needed for Constipation     gabapentin 100 MG capsule  Commonly known as: NEURONTIN  Take 1 capsule by mouth 3 times daily for 7 days. polyethylene glycol 17 g packet  Commonly known as: GLYCOLAX  Take 17 g by mouth daily as needed for Constipation            CHANGE how you take these medications      atorvastatin 20 MG tablet  Commonly known as: LIPITOR  What changed: Another medication with the same name was removed. Continue taking this medication, and follow the directions you see here.      baclofen 20 MG tablet  Commonly known as: LIORESAL  Take 1 tablet by mouth 2 times daily for 7 days  What changed:   medication strength  how much to take  when to take this  reasons to take this     oxyCODONE HCl 10 MG immediate release tablet  Commonly known as: OXY-IR  Take 1 tablet by mouth every 4 hours as needed for Pain for up to 7 days. Max Daily Amount: 60 mg  What changed:   medication strength  how much to take  when to take this  reasons to take this     Tresiba 100 UNIT/ML Soln  Generic drug: Insulin Degludec  What changed: Another medication with the same name was removed. Continue taking this medication, and follow the directions you see here. CONTINUE taking these medications      Dexcom G6 Sensor Misc     Dexcom G6 Transmitter Misc     insulin aspart 100 UNIT/ML injection pen  Commonly known as: NovoLOG     tamsulosin 0.4 MG capsule  Commonly known as: FLOMAX               Where to Get Your Medications        These medications were sent to [facility], [address] - F [phone] [address] [place] [address], [address] [facility]      Phone: [phone]   baclofen 20 MG tablet  diazePAM 10 MG tablet  docusate 100 MG Caps  gabapentin 100 MG capsule  oxyCODONE HCl 10 MG immediate release tablet  polyethylene glycol 17 g packet         Activity: activity as tolerated  Diet: ADULT DIET;  Regular; 4 carb choices (60 gm/meal)      Disposition: home  Discharged Condition: Stable  Follow Up:   [address]  [facility] [address]  [phone]    Schedule an appointment as soon as possible for a visit       [place] [address]  [phone]    If symptoms worsen    Boubacar Shah MD  [address] [place] [id number] [phone]    Schedule an appointment as soon as possible for a visit in 1 week(s)      [address]  [facility] [address]  [phone]            Code status:  Full Code     Total time spent on discharge, finalizing medications, referrals and arranging outpatient follow up was more than 30 minutes      Thank you Dr. Candace Callahan for the opportunity to be involved in this patients care.

## 2023-03-02 NOTE — PROGRESS NOTES
Physical Therapy  Attempt  Pt resting in bed. C/o diffuse muscle spasms, limiting mobility. Wants to wait for OOB activity until catheter is removed today. Will continue to follow.    Electronically signed by Keshia Hermosillo, PT [id number] on 3/2/2023 at 8:36 AM

## 2023-03-02 NOTE — PROGRESS NOTES
[address] Day: 6  Diagnosis: Central Cord Syndrome, C2 spinal cord injury and hx C1-2 fusion at [facility] years ago  BP (!) 156/98   Pulse 73   Temp 97.9 °F (36.6 °C) (Oral)   Resp 20   Ht 6' (1.829 m)   Wt 154 lb 8.7 oz (70.1 kg)   SpO2 99%   BMI 20.96 kg/m²     Patient doing much better. Standing at the bedside. His father is present. Complaining of pain and muscle spasm R>L, and back spasms, and limited mobility due to these. He has had diffuse numbness in the upper and lower extremities, but functional strength and mobility. Spasms/Pain is much better controlled on current medications, baclofen, valium, neurontin and oxycodone. His PCP is stopping the statin in hopes of reducing muscle pain also. On admission pain 9-10/10, now 3/10. Neurological Exam:   Moves all extremities. Neck stiffness and guarded movements. Imaging Studies:   MRI of the cervical/thoracic and lumbar spine has been reviewed by Dr. Farhan Hood. There is post surgical changes of a posterior C1-2 stabilization, and a 6mm area of central myelomalacia at C2. There is no significant spinal cord compression in the cervical spine or thoracic spine. Lumbar spine shows no stenosis. Meds reviewed. Baclofen was increased to 20 mg BID. He is planning to go home today after voiding, and f/u with [facility] under the guidance of his PCP. No surgical intervention indicated. Dr. Farhan Hood has signed off and patient has no further need to return. All questions answered.

## 2023-03-02 NOTE — PLAN OF CARE
Problem: ABCDS Injury Assessment  Goal: Absence of physical injury  3/2/2023 0120 by Chito Medina RN  Outcome: Adequate for Discharge     Problem: Discharge Planning  Goal: Discharge to home or other facility with appropriate resources  3/2/2023 0120 by Chito Medina RN  Outcome: Adequate for Discharge     Problem: Pain  Goal: Verbalizes/displays adequate comfort level or baseline comfort level  3/2/2023 0120 by Chito Medina RN  Outcome: Adequate for Discharge     Problem: Neurosensory - Adult  Goal: Achieves stable or improved neurological status  3/2/2023 0120 by Chito Medina RN  Outcome: Adequate for Discharge     Problem: Skin/Tissue Integrity - Adult  Goal: Skin integrity remains intact  3/2/2023 0120 by Chito Medina RN  Outcome: Adequate for Discharge     Problem: Musculoskeletal - Adult  Goal: Return mobility to safest level of function  3/2/2023 0120 by Chito Medina RN  Outcome: Adequate for Discharge     Problem: Metabolic/Fluid and Electrolytes - Adult  Goal: Electrolytes maintained within normal limits  3/2/2023 0120 by Chito Medina RN  Outcome: Adequate for Discharge     Problem: Anxiety  Goal: Will report anxiety at manageable levels  Description: INTERVENTIONS:  1. Administer medication as ordered  2. Teach and rehearse alternative coping skills  3. Provide emotional support with 1:1 interaction with staff  3/2/2023 0120 by Chito Medina RN  Outcome: Adequate for Discharge     Problem: Coping  Goal: Pt/Family able to verbalize concerns and demonstrate effective coping strategies  Description: INTERVENTIONS:  1. Assist patient/family to identify coping skills, available support systems and cultural and spiritual values  2. Provide emotional support, including active listening and acknowledgement of concerns of patient and caregivers  3. Reduce environmental stimuli, as able  4. Instruct patient/family in relaxation techniques, as appropriate  5.  Assess for spiritual pain/suffering and initiate Spiritual Care, Psychosocial Clinical Specialist consults as needed  3/2/2023 0120 by Gaylene Koyanagi, RN  Outcome: Adequate for Discharge     Problem: Behavior  Goal: Pt/Family maintain appropriate behavior and adhere to behavioral management agreement, if implemented  Description: INTERVENTIONS:  1. Assess patient/family's coping skills and  non-compliant behavior (including use of illegal substances)  2. Notify security of behavior or suspected illegal substances which indicate the need for search of the family and/or belongings  3. Encourage verbalization of thoughts and concerns in a socially appropriate manner  4. Utilize positive, consistent limit setting strategies supporting safety of patient, staff and others  5. Encourage participation in the decision making process about the behavioral management agreement  6. If a visitor's behavior poses a threat to safety call refer to organization policy.   7. Initiate consult with , Psychosocial CNS, Spiritual Care as appropriate  3/2/2023 0120 by Gaylene Koyanagi, RN  Outcome: Adequate for Discharge

## 2023-03-02 NOTE — PROGRESS NOTES
Discharge instructions reviewed with patient. Verbalized understanding of medications and follow up appointment. Denies any further questions or concerns. Medications delivered to patient's room. Father present to transport patient to home for discharge.

## 2023-03-02 NOTE — PLAN OF CARE
Problem: ABCDS Injury Assessment  Goal: Absence of physical injury  3/2/2023 1258 by Maribel Mccormack RN  Outcome: Completed     Problem: Discharge Planning  Goal: Discharge to home or other facility with appropriate resources  3/2/2023 1258 by Maribel Mccormack RN  Outcome: Completed     Problem: Pain  Goal: Verbalizes/displays adequate comfort level or baseline comfort level  3/2/2023 1258 by Maribel Mccormack RN  Outcome: Completed     Problem: Neurosensory - Adult  Goal: Achieves stable or improved neurological status  3/2/2023 1258 by Maribel Mccormack RN  Outcome: Completed     Problem: Skin/Tissue Integrity - Adult  Goal: Skin integrity remains intact  3/2/2023 1258 by Maribel Mccormack RN  Outcome: Completed     Problem: Musculoskeletal - Adult  Goal: Return mobility to safest level of function  3/2/2023 1258 by Maribel Mccormack RN  Outcome: Completed     Problem: Gastrointestinal - Adult  Goal: Maintains or returns to baseline bowel function  3/2/2023 1258 by Maribel Mccormack RN  Outcome: Completed     Problem: Metabolic/Fluid and Electrolytes - Adult  Goal: Electrolytes maintained within normal limits  3/2/2023 1258 by Maribel Mccormack RN  Outcome: Completed     Problem: Anxiety  Goal: Will report anxiety at manageable levels  Description: INTERVENTIONS:  1. Administer medication as ordered  2. Teach and rehearse alternative coping skills  3. Provide emotional support with 1:1 interaction with staff  3/2/2023 1258 by Maribel Mccormack RN  Outcome: Completed     Problem: Coping  Goal: Pt/Family able to verbalize concerns and demonstrate effective coping strategies  Description: INTERVENTIONS:  1. Assist patient/family to identify coping skills, available support systems and cultural and spiritual values  2. Provide emotional support, including active listening and acknowledgement of concerns of patient and caregivers  3. Reduce environmental stimuli, as able  4.  Instruct patient/family in relaxation techniques, as appropriate  5. Assess for spiritual pain/suffering and initiate Spiritual Care, Psychosocial Clinical Specialist consults as needed  3/2/2023 1258 by Luz Hatch RN  Outcome: Completed     Problem: Behavior  Goal: Pt/Family maintain appropriate behavior and adhere to behavioral management agreement, if implemented  Description: INTERVENTIONS:  1. Assess patient/family's coping skills and  non-compliant behavior (including use of illegal substances)  2. Notify security of behavior or suspected illegal substances which indicate the need for search of the family and/or belongings  3. Encourage verbalization of thoughts and concerns in a socially appropriate manner  4. Utilize positive, consistent limit setting strategies supporting safety of patient, staff and others  5. Encourage participation in the decision making process about the behavioral management agreement  6. If a visitor's behavior poses a threat to safety call refer to organization policy.   7. Initiate consult with , Psychosocial CNS, Spiritual Care as appropriate  3/2/2023 1258 by Luz Hatch RN  Outcome: Completed